# Patient Record
Sex: MALE | Race: OTHER | Employment: FULL TIME | URBAN - METROPOLITAN AREA
[De-identification: names, ages, dates, MRNs, and addresses within clinical notes are randomized per-mention and may not be internally consistent; named-entity substitution may affect disease eponyms.]

---

## 2018-01-14 ENCOUNTER — APPOINTMENT (OUTPATIENT)
Dept: GENERAL RADIOLOGY | Age: 53
End: 2018-01-14
Payer: MEDICARE

## 2018-01-14 ENCOUNTER — HOSPITAL ENCOUNTER (EMERGENCY)
Age: 53
Discharge: HOME | End: 2018-01-14
Payer: MEDICARE

## 2018-01-14 VITALS
TEMPERATURE: 97.8 F | OXYGEN SATURATION: 97 % | DIASTOLIC BLOOD PRESSURE: 73 MMHG | WEIGHT: 173.7 LBS | HEART RATE: 72 BPM | RESPIRATION RATE: 17 BRPM | BODY MASS INDEX: 29.65 KG/M2 | SYSTOLIC BLOOD PRESSURE: 148 MMHG | HEIGHT: 64 IN

## 2018-01-14 DIAGNOSIS — J40 BRONCHITIS: Primary | ICD-10-CM

## 2018-01-14 LAB
DIRECT EXAM: NORMAL
Lab: NORMAL
SPECIMEN DESCRIPTION: NORMAL
STATUS: NORMAL

## 2018-01-14 PROCEDURE — 87804 INFLUENZA ASSAY W/OPTIC: CPT

## 2018-01-14 PROCEDURE — 71046 X-RAY EXAM CHEST 2 VIEWS: CPT

## 2018-01-14 PROCEDURE — 99284 EMERGENCY DEPT VISIT MOD MDM: CPT

## 2018-01-14 PROCEDURE — 6360000002 HC RX W HCPCS: Performed by: NURSE PRACTITIONER

## 2018-01-14 PROCEDURE — 96372 THER/PROPH/DIAG INJ SC/IM: CPT

## 2018-01-14 RX ORDER — KETOROLAC TROMETHAMINE 30 MG/ML
60 INJECTION, SOLUTION INTRAMUSCULAR; INTRAVENOUS ONCE
Status: COMPLETED | OUTPATIENT
Start: 2018-01-14 | End: 2018-01-14

## 2018-01-14 RX ORDER — DEXTROMETHORPHAN HYDROBROMIDE AND PROMETHAZINE HYDROCHLORIDE 15; 6.25 MG/5ML; MG/5ML
5 SYRUP ORAL 4 TIMES DAILY PRN
Qty: 118 ML | Refills: 0 | Status: SHIPPED | OUTPATIENT
Start: 2018-01-14 | End: 2018-01-21

## 2018-01-14 RX ORDER — AZITHROMYCIN 250 MG/1
TABLET, FILM COATED ORAL
Qty: 1 PACKET | Refills: 0 | Status: SHIPPED | OUTPATIENT
Start: 2018-01-14 | End: 2018-01-24

## 2018-01-14 RX ADMIN — KETOROLAC TROMETHAMINE 60 MG: 30 INJECTION, SOLUTION INTRAMUSCULAR at 20:20

## 2018-01-14 ASSESSMENT — PAIN DESCRIPTION - FREQUENCY: FREQUENCY: INTERMITTENT

## 2018-01-14 ASSESSMENT — PAIN DESCRIPTION - ORIENTATION: ORIENTATION: LEFT;MID;UPPER

## 2018-01-14 ASSESSMENT — PAIN SCALES - GENERAL: PAINLEVEL_OUTOF10: 8

## 2018-01-14 ASSESSMENT — PAIN DESCRIPTION - LOCATION: LOCATION: ABDOMEN

## 2018-01-14 ASSESSMENT — PAIN DESCRIPTION - DESCRIPTORS: DESCRIPTORS: SHARP

## 2018-01-14 ASSESSMENT — PAIN DESCRIPTION - PAIN TYPE: TYPE: ACUTE PAIN

## 2018-01-15 ASSESSMENT — ENCOUNTER SYMPTOMS
VOMITING: 0
BACK PAIN: 1
SORE THROAT: 0
DIARRHEA: 0
SINUS PRESSURE: 0
COLOR CHANGE: 0
COUGH: 1
NAUSEA: 0
ABDOMINAL PAIN: 0
RHINORRHEA: 0
WHEEZING: 0
SHORTNESS OF BREATH: 0
CONSTIPATION: 0

## 2018-01-15 NOTE — ED NOTES
Pt presents reports flu like s/sx that has been ongoing for the past 2 days. Denies any NVD. Reports fatigue and generalized body aches. Has not had the flu vaccination this year. Respirations even, no labored.       Jennifer Scales RN  01/14/18 2032

## 2018-01-15 NOTE — ED PROVIDER NOTES
nausea and vomiting. Genitourinary: Negative for dysuria and hematuria. Musculoskeletal: Positive for back pain and myalgias. Negative for arthralgias. Skin: Negative for color change and rash. Neurological: Negative for dizziness, weakness and headaches. Hematological: Negative for adenopathy. Except as noted above the remainder of the review of systems was reviewed and negative. PHYSICAL EXAM    (up to 7 for level 4, 8 or more for level 5)     ED Triage Vitals [01/14/18 1921]   BP Temp Temp Source Pulse Resp SpO2 Height Weight   (!) 148/73 97.8 °F (36.6 °C) Oral 72 17 97 % 5' 4\" (1.626 m) 173 lb 11.2 oz (78.8 kg)       Physical Exam   Constitutional: He is oriented to person, place, and time. He appears well-developed and well-nourished. HENT:   Head: Normocephalic and atraumatic. Mouth/Throat: Oropharynx is clear and moist.   Eyes: Conjunctivae are normal. Pupils are equal, round, and reactive to light. Neck: Normal range of motion. Neck supple. Cardiovascular: Normal rate and regular rhythm. Pulmonary/Chest: Effort normal and breath sounds normal. No stridor. No respiratory distress. Abdominal: Soft. Bowel sounds are normal.   Musculoskeletal: Normal range of motion. Lymphadenopathy:     He has no cervical adenopathy. Neurological: He is alert and oriented to person, place, and time. Skin: Skin is warm and dry. No rash noted. Psychiatric: He has a normal mood and affect. Vitals reviewed.         RADIOLOGY:   Non-plain film images such as CT, Ultrasound and MRI are read by the radiologistMagalie Greer radiographic images are visualized and preliminarily interpreted by the emergency physician with the below findings:    Xr Chest Standard (2 Vw)    Result Date: 1/14/2018  EXAMINATION: TWO VIEWS OF THE CHEST 1/14/2018 8:12 pm COMPARISON: 07/13/2012 HISTORY: ORDERING SYSTEM PROVIDED HISTORY: cough TECHNOLOGIST PROVIDED HISTORY: Reason for exam:->cough Ordering Physician Provided

## 2019-05-05 ENCOUNTER — HOSPITAL ENCOUNTER (OUTPATIENT)
Age: 54
Setting detail: OBSERVATION
Discharge: HOME | End: 2019-05-06
Attending: EMERGENCY MEDICINE | Admitting: INTERNAL MEDICINE
Payer: MEDICARE

## 2019-05-05 ENCOUNTER — APPOINTMENT (OUTPATIENT)
Dept: CT IMAGING | Age: 54
End: 2019-05-05
Payer: MEDICARE

## 2019-05-05 ENCOUNTER — APPOINTMENT (OUTPATIENT)
Dept: GENERAL RADIOLOGY | Age: 54
End: 2019-05-05
Payer: MEDICARE

## 2019-05-05 DIAGNOSIS — R94.31 ABNORMAL EKG: ICD-10-CM

## 2019-05-05 DIAGNOSIS — R22.2 PARASPINAL MASS: ICD-10-CM

## 2019-05-05 DIAGNOSIS — R07.9 CHEST PAIN, UNSPECIFIED TYPE: Primary | ICD-10-CM

## 2019-05-05 DIAGNOSIS — R22.2 MASS OF CHEST WALL, RIGHT: ICD-10-CM

## 2019-05-05 DIAGNOSIS — R06.00 DYSPNEA, UNSPECIFIED TYPE: ICD-10-CM

## 2019-05-05 LAB
ABSOLUTE EOS #: 0.33 K/UL (ref 0–0.44)
ABSOLUTE IMMATURE GRANULOCYTE: 0.05 K/UL (ref 0–0.3)
ABSOLUTE LYMPH #: 3.09 K/UL (ref 1.1–3.7)
ABSOLUTE MONO #: 0.58 K/UL (ref 0.1–1.2)
ANION GAP SERPL CALCULATED.3IONS-SCNC: 12 MMOL/L (ref 9–17)
BASOPHILS # BLD: 1 % (ref 0–2)
BASOPHILS ABSOLUTE: 0.05 K/UL (ref 0–0.2)
BNP INTERPRETATION: NORMAL
BUN BLDV-MCNC: 10 MG/DL (ref 6–20)
BUN/CREAT BLD: 13 (ref 9–20)
CALCIUM SERPL-MCNC: 9.2 MG/DL (ref 8.6–10.4)
CHLORIDE BLD-SCNC: 106 MMOL/L (ref 98–107)
CO2: 22 MMOL/L (ref 20–31)
CREAT SERPL-MCNC: 0.79 MG/DL (ref 0.7–1.2)
DIFFERENTIAL TYPE: ABNORMAL
EOSINOPHILS RELATIVE PERCENT: 5 % (ref 1–4)
GFR AFRICAN AMERICAN: >60 ML/MIN
GFR NON-AFRICAN AMERICAN: >60 ML/MIN
GFR SERPL CREATININE-BSD FRML MDRD: ABNORMAL ML/MIN/{1.73_M2}
GFR SERPL CREATININE-BSD FRML MDRD: ABNORMAL ML/MIN/{1.73_M2}
GLUCOSE BLD-MCNC: 125 MG/DL (ref 70–99)
HCT VFR BLD CALC: 44.3 % (ref 40.7–50.3)
HEMOGLOBIN: 15.1 G/DL (ref 13–17)
IMMATURE GRANULOCYTES: 1 %
INR BLD: 1
LYMPHOCYTES # BLD: 43 % (ref 24–43)
MCH RBC QN AUTO: 30.8 PG (ref 25.2–33.5)
MCHC RBC AUTO-ENTMCNC: 34.1 G/DL (ref 28.4–34.8)
MCV RBC AUTO: 90.4 FL (ref 82.6–102.9)
MONOCYTES # BLD: 8 % (ref 3–12)
NRBC AUTOMATED: 0 PER 100 WBC
PARTIAL THROMBOPLASTIN TIME: 25.6 SEC (ref 23–31)
PDW BLD-RTO: 11.8 % (ref 11.8–14.4)
PLATELET # BLD: 267 K/UL (ref 138–453)
PLATELET ESTIMATE: ABNORMAL
PMV BLD AUTO: 8.3 FL (ref 8.1–13.5)
POTASSIUM SERPL-SCNC: 3.8 MMOL/L (ref 3.7–5.3)
PRO-BNP: <20 PG/ML
PROTHROMBIN TIME: 10.2 SEC (ref 9.7–11.6)
RBC # BLD: 4.9 M/UL (ref 4.21–5.77)
RBC # BLD: ABNORMAL 10*6/UL
SEG NEUTROPHILS: 43 % (ref 36–65)
SEGMENTED NEUTROPHILS ABSOLUTE COUNT: 3.1 K/UL (ref 1.5–8.1)
SODIUM BLD-SCNC: 140 MMOL/L (ref 135–144)
TROPONIN INTERP: NORMAL
TROPONIN T: NORMAL NG/ML
TROPONIN, HIGH SENSITIVITY: 8 NG/L (ref 0–22)
WBC # BLD: 7.2 K/UL (ref 3.5–11.3)
WBC # BLD: ABNORMAL 10*3/UL

## 2019-05-05 PROCEDURE — 1200000000 HC SEMI PRIVATE

## 2019-05-05 PROCEDURE — 84484 ASSAY OF TROPONIN QUANT: CPT

## 2019-05-05 PROCEDURE — 74177 CT ABD & PELVIS W/CONTRAST: CPT

## 2019-05-05 PROCEDURE — 71260 CT THORAX DX C+: CPT

## 2019-05-05 PROCEDURE — 80048 BASIC METABOLIC PNL TOTAL CA: CPT

## 2019-05-05 PROCEDURE — 6360000004 HC RX CONTRAST MEDICATION: Performed by: EMERGENCY MEDICINE

## 2019-05-05 PROCEDURE — 83880 ASSAY OF NATRIURETIC PEPTIDE: CPT

## 2019-05-05 PROCEDURE — 85610 PROTHROMBIN TIME: CPT

## 2019-05-05 PROCEDURE — 85730 THROMBOPLASTIN TIME PARTIAL: CPT

## 2019-05-05 PROCEDURE — 99285 EMERGENCY DEPT VISIT HI MDM: CPT

## 2019-05-05 PROCEDURE — 85025 COMPLETE CBC W/AUTO DIFF WBC: CPT

## 2019-05-05 PROCEDURE — 71045 X-RAY EXAM CHEST 1 VIEW: CPT

## 2019-05-05 PROCEDURE — 6370000000 HC RX 637 (ALT 250 FOR IP): Performed by: EMERGENCY MEDICINE

## 2019-05-05 PROCEDURE — 93005 ELECTROCARDIOGRAM TRACING: CPT

## 2019-05-05 PROCEDURE — 99221 1ST HOSP IP/OBS SF/LOW 40: CPT | Performed by: NURSE PRACTITIONER

## 2019-05-05 PROCEDURE — 2580000003 HC RX 258: Performed by: EMERGENCY MEDICINE

## 2019-05-05 PROCEDURE — 36415 COLL VENOUS BLD VENIPUNCTURE: CPT

## 2019-05-05 RX ORDER — ASPIRIN 81 MG/1
324 TABLET, CHEWABLE ORAL ONCE
Status: COMPLETED | OUTPATIENT
Start: 2019-05-05 | End: 2019-05-05

## 2019-05-05 RX ORDER — 0.9 % SODIUM CHLORIDE 0.9 %
80 INTRAVENOUS SOLUTION INTRAVENOUS ONCE
Status: COMPLETED | OUTPATIENT
Start: 2019-05-05 | End: 2019-05-05

## 2019-05-05 RX ORDER — SODIUM CHLORIDE 0.9 % (FLUSH) 0.9 %
10 SYRINGE (ML) INJECTION PRN
Status: DISCONTINUED | OUTPATIENT
Start: 2019-05-05 | End: 2019-05-06 | Stop reason: SDUPTHER

## 2019-05-05 RX ADMIN — ASPIRIN 81 MG 324 MG: 81 TABLET ORAL at 20:53

## 2019-05-05 RX ADMIN — SODIUM CHLORIDE 80 ML: 9 INJECTION, SOLUTION INTRAVENOUS at 22:18

## 2019-05-05 RX ADMIN — Medication 10 ML: at 22:19

## 2019-05-05 RX ADMIN — IOPAMIDOL 75 ML: 755 INJECTION, SOLUTION INTRAVENOUS at 22:18

## 2019-05-05 ASSESSMENT — PAIN SCALES - GENERAL
PAINLEVEL_OUTOF10: 5
PAINLEVEL_OUTOF10: 3

## 2019-05-05 ASSESSMENT — HEART SCORE: ECG: 1

## 2019-05-05 ASSESSMENT — ENCOUNTER SYMPTOMS
BACK PAIN: 0
ABDOMINAL DISTENTION: 0
EYE PAIN: 0
SHORTNESS OF BREATH: 1
FACIAL SWELLING: 0
ABDOMINAL PAIN: 0
CHEST TIGHTNESS: 1
EYE DISCHARGE: 0

## 2019-05-05 NOTE — LETTER
[address] [address]  Phone: [phone]             May 6, 2019    Patient: Camila Mao   YOB: 1965   Date of Visit: 5/5/2019       To Whom It May Concern:    Camila Mao was seen and treated in our facility  beginning 5/5/2019 until 5/6/2019. He may return to work after cleared from primary care physician in one week.       Sincerely,       Na Zhao RN         Signature:__________________________________

## 2019-05-06 ENCOUNTER — APPOINTMENT (OUTPATIENT)
Dept: MRI IMAGING | Age: 54
End: 2019-05-06
Payer: MEDICARE

## 2019-05-06 ENCOUNTER — APPOINTMENT (OUTPATIENT)
Dept: NUCLEAR MEDICINE | Age: 54
End: 2019-05-06
Payer: MEDICARE

## 2019-05-06 VITALS
OXYGEN SATURATION: 96 % | BODY MASS INDEX: 30.34 KG/M2 | SYSTOLIC BLOOD PRESSURE: 133 MMHG | HEIGHT: 64 IN | RESPIRATION RATE: 16 BRPM | DIASTOLIC BLOOD PRESSURE: 79 MMHG | HEART RATE: 77 BPM | WEIGHT: 177.7 LBS | TEMPERATURE: 97.5 F

## 2019-05-06 PROBLEM — E78.2 MIXED HYPERLIPIDEMIA: Status: ACTIVE | Noted: 2019-05-06

## 2019-05-06 PROBLEM — R07.89 MUSCULOSKELETAL CHEST PAIN: Status: ACTIVE | Noted: 2019-05-06

## 2019-05-06 PROBLEM — R22.2 PARASPINAL MASS: Status: ACTIVE | Noted: 2019-05-06

## 2019-05-06 PROBLEM — R07.1 CHEST PAIN ON BREATHING: Status: ACTIVE | Noted: 2019-05-05

## 2019-05-06 PROBLEM — R07.9 CHEST PAIN: Status: ACTIVE | Noted: 2019-05-06

## 2019-05-06 LAB
ALBUMIN SERPL-MCNC: 4.1 G/DL (ref 3.5–5.2)
ALBUMIN/GLOBULIN RATIO: NORMAL (ref 1–2.5)
ALP BLD-CCNC: 97 U/L (ref 40–129)
ALT SERPL-CCNC: 41 U/L (ref 5–41)
ANION GAP SERPL CALCULATED.3IONS-SCNC: 9 MMOL/L (ref 9–17)
AST SERPL-CCNC: 25 U/L
BILIRUB SERPL-MCNC: 0.37 MG/DL (ref 0.3–1.2)
BUN BLDV-MCNC: 10 MG/DL (ref 6–20)
BUN/CREAT BLD: 14 (ref 9–20)
CALCIUM SERPL-MCNC: 9.1 MG/DL (ref 8.6–10.4)
CHLORIDE BLD-SCNC: 107 MMOL/L (ref 98–107)
CHOLESTEROL/HDL RATIO: 4.5
CHOLESTEROL: 207 MG/DL
CO2: 24 MMOL/L (ref 20–31)
CREAT SERPL-MCNC: 0.73 MG/DL (ref 0.7–1.2)
EKG ATRIAL RATE: 64 BPM
EKG ATRIAL RATE: 70 BPM
EKG P AXIS: 36 DEGREES
EKG P AXIS: 46 DEGREES
EKG P-R INTERVAL: 148 MS
EKG P-R INTERVAL: 164 MS
EKG Q-T INTERVAL: 386 MS
EKG Q-T INTERVAL: 404 MS
EKG QRS DURATION: 122 MS
EKG QRS DURATION: 124 MS
EKG QTC CALCULATION (BAZETT): 416 MS
EKG QTC CALCULATION (BAZETT): 416 MS
EKG R AXIS: -36 DEGREES
EKG R AXIS: -48 DEGREES
EKG T AXIS: -11 DEGREES
EKG T AXIS: 12 DEGREES
EKG VENTRICULAR RATE: 64 BPM
EKG VENTRICULAR RATE: 70 BPM
GFR AFRICAN AMERICAN: >60 ML/MIN
GFR NON-AFRICAN AMERICAN: >60 ML/MIN
GFR SERPL CREATININE-BSD FRML MDRD: NORMAL ML/MIN/{1.73_M2}
GFR SERPL CREATININE-BSD FRML MDRD: NORMAL ML/MIN/{1.73_M2}
GLUCOSE BLD-MCNC: 96 MG/DL (ref 70–99)
HCT VFR BLD CALC: 46 % (ref 40.7–50.3)
HDLC SERPL-MCNC: 46 MG/DL
HEMOGLOBIN: 15.3 G/DL (ref 13–17)
LDL CHOLESTEROL: 118 MG/DL (ref 0–130)
LV EF: 51 %
LV EF: 60 %
LVEF MODALITY: NORMAL
LVEF MODALITY: NORMAL
MAGNESIUM: 2.2 MG/DL (ref 1.6–2.6)
MCH RBC QN AUTO: 30.4 PG (ref 25.2–33.5)
MCHC RBC AUTO-ENTMCNC: 33.3 G/DL (ref 28.4–34.8)
MCV RBC AUTO: 91.3 FL (ref 82.6–102.9)
NRBC AUTOMATED: 0 PER 100 WBC
PDW BLD-RTO: 11.8 % (ref 11.8–14.4)
PLATELET # BLD: 287 K/UL (ref 138–453)
PMV BLD AUTO: 8.5 FL (ref 8.1–13.5)
POTASSIUM SERPL-SCNC: 4.3 MMOL/L (ref 3.7–5.3)
RBC # BLD: 5.04 M/UL (ref 4.21–5.77)
SODIUM BLD-SCNC: 140 MMOL/L (ref 135–144)
TOTAL PROTEIN: 6.9 G/DL (ref 6.4–8.3)
TRIGL SERPL-MCNC: 214 MG/DL
TROPONIN INTERP: NORMAL
TROPONIN INTERP: NORMAL
TROPONIN T: NORMAL NG/ML
TROPONIN T: NORMAL NG/ML
TROPONIN, HIGH SENSITIVITY: 7 NG/L (ref 0–22)
TROPONIN, HIGH SENSITIVITY: 7 NG/L (ref 0–22)
VLDLC SERPL CALC-MCNC: ABNORMAL MG/DL (ref 1–30)
WBC # BLD: 7.3 K/UL (ref 3.5–11.3)

## 2019-05-06 PROCEDURE — G0378 HOSPITAL OBSERVATION PER HR: HCPCS

## 2019-05-06 PROCEDURE — 99217 PR OBSERVATION CARE DISCHARGE MANAGEMENT: CPT | Performed by: INTERNAL MEDICINE

## 2019-05-06 PROCEDURE — 83735 ASSAY OF MAGNESIUM: CPT

## 2019-05-06 PROCEDURE — 36415 COLL VENOUS BLD VENIPUNCTURE: CPT

## 2019-05-06 PROCEDURE — 84484 ASSAY OF TROPONIN QUANT: CPT

## 2019-05-06 PROCEDURE — 85027 COMPLETE CBC AUTOMATED: CPT

## 2019-05-06 PROCEDURE — 3430000000 HC RX DIAGNOSTIC RADIOPHARMACEUTICAL: Performed by: INTERNAL MEDICINE

## 2019-05-06 PROCEDURE — 6360000002 HC RX W HCPCS: Performed by: NURSE PRACTITIONER

## 2019-05-06 PROCEDURE — 6370000000 HC RX 637 (ALT 250 FOR IP): Performed by: NURSE PRACTITIONER

## 2019-05-06 PROCEDURE — A9579 GAD-BASE MR CONTRAST NOS,1ML: HCPCS | Performed by: NURSE PRACTITIONER

## 2019-05-06 PROCEDURE — 96372 THER/PROPH/DIAG INJ SC/IM: CPT

## 2019-05-06 PROCEDURE — 93306 TTE W/DOPPLER COMPLETE: CPT

## 2019-05-06 PROCEDURE — 6360000002 HC RX W HCPCS: Performed by: INTERNAL MEDICINE

## 2019-05-06 PROCEDURE — 72157 MRI CHEST SPINE W/O & W/DYE: CPT

## 2019-05-06 PROCEDURE — 2580000003 HC RX 258: Performed by: INTERNAL MEDICINE

## 2019-05-06 PROCEDURE — 6360000004 HC RX CONTRAST MEDICATION: Performed by: NURSE PRACTITIONER

## 2019-05-06 PROCEDURE — 80061 LIPID PANEL: CPT

## 2019-05-06 PROCEDURE — 80053 COMPREHEN METABOLIC PANEL: CPT

## 2019-05-06 PROCEDURE — 2580000003 HC RX 258: Performed by: NURSE PRACTITIONER

## 2019-05-06 PROCEDURE — A9500 TC99M SESTAMIBI: HCPCS | Performed by: INTERNAL MEDICINE

## 2019-05-06 PROCEDURE — 93017 CV STRESS TEST TRACING ONLY: CPT

## 2019-05-06 PROCEDURE — 78452 HT MUSCLE IMAGE SPECT MULT: CPT

## 2019-05-06 RX ORDER — ATROPINE SULFATE 0.1 MG/ML
0.5 INJECTION INTRAVENOUS EVERY 5 MIN PRN
Status: ACTIVE | OUTPATIENT
Start: 2019-05-06 | End: 2019-05-06

## 2019-05-06 RX ORDER — ONDANSETRON 2 MG/ML
4 INJECTION INTRAMUSCULAR; INTRAVENOUS EVERY 6 HOURS PRN
Status: DISCONTINUED | OUTPATIENT
Start: 2019-05-06 | End: 2019-05-06 | Stop reason: SDUPTHER

## 2019-05-06 RX ORDER — ONDANSETRON 2 MG/ML
4 INJECTION INTRAMUSCULAR; INTRAVENOUS EVERY 6 HOURS PRN
Status: DISCONTINUED | OUTPATIENT
Start: 2019-05-06 | End: 2019-05-06 | Stop reason: HOSPADM

## 2019-05-06 RX ORDER — ONDANSETRON 4 MG/1
4 TABLET, ORALLY DISINTEGRATING ORAL EVERY 6 HOURS PRN
Status: DISCONTINUED | OUTPATIENT
Start: 2019-05-06 | End: 2019-05-06 | Stop reason: HOSPADM

## 2019-05-06 RX ORDER — POTASSIUM CHLORIDE 7.45 MG/ML
10 INJECTION INTRAVENOUS PRN
Status: DISCONTINUED | OUTPATIENT
Start: 2019-05-06 | End: 2019-05-06 | Stop reason: SDUPTHER

## 2019-05-06 RX ORDER — SODIUM CHLORIDE 0.9 % (FLUSH) 0.9 %
10 SYRINGE (ML) INJECTION PRN
Status: ACTIVE | OUTPATIENT
Start: 2019-05-06 | End: 2019-05-06

## 2019-05-06 RX ORDER — ALBUTEROL SULFATE 90 UG/1
2 AEROSOL, METERED RESPIRATORY (INHALATION) PRN
Status: ACTIVE | OUTPATIENT
Start: 2019-05-06 | End: 2019-05-06

## 2019-05-06 RX ORDER — AMINOPHYLLINE DIHYDRATE 25 MG/ML
50 INJECTION, SOLUTION INTRAVENOUS PRN
Status: ACTIVE | OUTPATIENT
Start: 2019-05-06 | End: 2019-05-06

## 2019-05-06 RX ORDER — SODIUM CHLORIDE 0.9 % (FLUSH) 0.9 %
10 SYRINGE (ML) INJECTION
Status: COMPLETED | OUTPATIENT
Start: 2019-05-06 | End: 2019-05-06

## 2019-05-06 RX ORDER — POTASSIUM CHLORIDE 7.45 MG/ML
10 INJECTION INTRAVENOUS PRN
Status: DISCONTINUED | OUTPATIENT
Start: 2019-05-06 | End: 2019-05-06 | Stop reason: HOSPADM

## 2019-05-06 RX ORDER — SODIUM CHLORIDE 0.9 % (FLUSH) 0.9 %
10 SYRINGE (ML) INJECTION PRN
Status: DISCONTINUED | OUTPATIENT
Start: 2019-05-06 | End: 2019-05-06 | Stop reason: HOSPADM

## 2019-05-06 RX ORDER — METOPROLOL TARTRATE 5 MG/5ML
5 INJECTION INTRAVENOUS EVERY 5 MIN PRN
Status: ACTIVE | OUTPATIENT
Start: 2019-05-06 | End: 2019-05-06

## 2019-05-06 RX ORDER — SODIUM CHLORIDE 9 MG/ML
500 INJECTION, SOLUTION INTRAVENOUS CONTINUOUS PRN
Status: ACTIVE | OUTPATIENT
Start: 2019-05-06 | End: 2019-05-06

## 2019-05-06 RX ORDER — NITROGLYCERIN 0.4 MG/1
0.4 TABLET SUBLINGUAL EVERY 5 MIN PRN
Status: DISCONTINUED | OUTPATIENT
Start: 2019-05-06 | End: 2019-05-06

## 2019-05-06 RX ORDER — ATORVASTATIN CALCIUM 40 MG/1
40 TABLET, FILM COATED ORAL NIGHTLY
Status: DISCONTINUED | OUTPATIENT
Start: 2019-05-06 | End: 2019-05-06 | Stop reason: HOSPADM

## 2019-05-06 RX ORDER — ACETAMINOPHEN 325 MG/1
650 TABLET ORAL EVERY 4 HOURS PRN
Status: DISCONTINUED | OUTPATIENT
Start: 2019-05-06 | End: 2019-05-06 | Stop reason: HOSPADM

## 2019-05-06 RX ORDER — MAGNESIUM SULFATE 1 G/100ML
1 INJECTION INTRAVENOUS PRN
Status: DISCONTINUED | OUTPATIENT
Start: 2019-05-06 | End: 2019-05-06 | Stop reason: HOSPADM

## 2019-05-06 RX ORDER — ATORVASTATIN CALCIUM 40 MG/1
40 TABLET, FILM COATED ORAL NIGHTLY
Qty: 30 TABLET | Refills: 3 | Status: SHIPPED | OUTPATIENT
Start: 2019-05-06 | End: 2020-07-08 | Stop reason: ALTCHOICE

## 2019-05-06 RX ORDER — NICOTINE 21 MG/24HR
1 PATCH, TRANSDERMAL 24 HOURS TRANSDERMAL DAILY
Status: DISCONTINUED | OUTPATIENT
Start: 2019-05-06 | End: 2019-05-06 | Stop reason: HOSPADM

## 2019-05-06 RX ORDER — SODIUM CHLORIDE 0.9 % (FLUSH) 0.9 %
10 SYRINGE (ML) INJECTION EVERY 12 HOURS SCHEDULED
Status: DISCONTINUED | OUTPATIENT
Start: 2019-05-06 | End: 2019-05-06 | Stop reason: HOSPADM

## 2019-05-06 RX ORDER — POTASSIUM CHLORIDE 20 MEQ/1
40 TABLET, EXTENDED RELEASE ORAL PRN
Status: DISCONTINUED | OUTPATIENT
Start: 2019-05-06 | End: 2019-05-06 | Stop reason: HOSPADM

## 2019-05-06 RX ORDER — NITROGLYCERIN 0.4 MG/1
0.4 TABLET SUBLINGUAL EVERY 5 MIN PRN
Status: ACTIVE | OUTPATIENT
Start: 2019-05-06 | End: 2019-05-06

## 2019-05-06 RX ADMIN — Medication 10 ML: at 11:58

## 2019-05-06 RX ADMIN — REGADENOSON 0.4 MG: 0.08 INJECTION, SOLUTION INTRAVENOUS at 09:36

## 2019-05-06 RX ADMIN — ENOXAPARIN SODIUM 40 MG: 40 INJECTION SUBCUTANEOUS at 11:56

## 2019-05-06 RX ADMIN — TETRAKIS(2-METHOXYISOBUTYLISOCYANIDE)COPPER(I) TETRAFLUOROBORATE 39.8 MILLICURIE: 1 INJECTION, POWDER, LYOPHILIZED, FOR SOLUTION INTRAVENOUS at 12:45

## 2019-05-06 RX ADMIN — GADOTERIDOL 17 ML: 279.3 INJECTION, SOLUTION INTRAVENOUS at 13:41

## 2019-05-06 RX ADMIN — ASPIRIN 325 MG: 325 TABLET, DELAYED RELEASE ORAL at 11:56

## 2019-05-06 RX ADMIN — Medication 10 ML: at 09:36

## 2019-05-06 RX ADMIN — Medication 10 ML: at 13:41

## 2019-05-06 RX ADMIN — TETRAKIS(2-METHOXYISOBUTYLISOCYANIDE)COPPER(I) TETRAFLUOROBORATE 19.3 MILLICURIE: 1 INJECTION, POWDER, LYOPHILIZED, FOR SOLUTION INTRAVENOUS at 09:35

## 2019-05-06 ASSESSMENT — ENCOUNTER SYMPTOMS
COUGH: 0
SHORTNESS OF BREATH: 0
ABDOMINAL PAIN: 0
VOMITING: 0
BACK PAIN: 1
NAUSEA: 0

## 2019-05-06 ASSESSMENT — PAIN DESCRIPTION - DESCRIPTORS: DESCRIPTORS: ACHING

## 2019-05-06 ASSESSMENT — PAIN DESCRIPTION - ONSET: ONSET: ON-GOING

## 2019-05-06 ASSESSMENT — PAIN DESCRIPTION - PAIN TYPE: TYPE: ACUTE PAIN

## 2019-05-06 ASSESSMENT — PAIN DESCRIPTION - FREQUENCY: FREQUENCY: CONTINUOUS

## 2019-05-06 ASSESSMENT — PAIN SCALES - GENERAL
PAINLEVEL_OUTOF10: 5
PAINLEVEL_OUTOF10: 0

## 2019-05-06 ASSESSMENT — PAIN - FUNCTIONAL ASSESSMENT: PAIN_FUNCTIONAL_ASSESSMENT: PREVENTS OR INTERFERES SOME ACTIVE ACTIVITIES AND ADLS

## 2019-05-06 ASSESSMENT — PAIN DESCRIPTION - PROGRESSION: CLINICAL_PROGRESSION: NOT CHANGED

## 2019-05-06 ASSESSMENT — PAIN DESCRIPTION - LOCATION: LOCATION: BACK

## 2019-05-06 NOTE — PLAN OF CARE
Problem: Pain:  Goal: Control of acute pain  Description  Control of acute pain  Outcome: Ongoing  Patient complaining of back pain with an onset one week ago. Not taking any pain meds at this time. Writer will continue to monitor. Problem: Falls - Risk of:  Goal: Will remain free from falls  Description  Will remain free from falls  Outcome: Ongoing   Falling star program in place. Side rails up x2. Call light and personal belongings within reach. Continuing to maintain safe environment. Bed in lowest position and locked. Appropriate Identification armbands in place. Non-skid foot wear in place.

## 2019-05-06 NOTE — H&P
[facility]    HISTORY AND PHYSICAL EXAMINATION            Date:   5/6/2019  Patient name:  Callie Veronica  Date of admission:  5/5/2019  8:36 PM  MRN:   [id number]  Account:  [de-identified]  YOB: 1965  PCP:    Shemar Sneed MD  Room:   7280/7527-36  Code Status:    Full Code    Chief Complaint:     Chief Complaint   Patient presents with    Shortness of Breath     x weeks; sharp pain    Back Pain     upper; with inspiration       History Obtained From:     patient    History of Present Illness: The patient is a 52 y.o. / male who presents with Shortness of Breath (x weeks; sharp pain) and Back Pain (upper; with inspiration)   and he is admitted to the hospital for the management of  chest pain with breathing. The patient states for the last 2-3 weeks he has felt short of breath, dyspnic and fatigued. He reports that today he started having pain between his shoulder blades he rates a 5/10 when taking a deep breath. He describes his pain as sharp and intermittent with breathing. The patient denies trauma, diaphoresis, nausea or vomiting. His only medical history is hypertension. He states he quit smoking at least 15 or 20 years ago and he does not have a drinking history. Past Medical History:     Past Medical History:   Diagnosis Date    HTN (hypertension)         Past Surgical History:     Past Surgical History:   Procedure Laterality Date    APPENDECTOMY      VASECTOMY          Medications Prior to Admission:     Prior to Admission medications    Not on File        Allergies:     Patient has no known allergies. Social History:     Tobacco:    reports that he quit smoking about 19 years ago. He has never used smokeless tobacco.  Alcohol:      reports that he does not drink alcohol. Drug Use:  reports that he does not use drugs.     Family History:     Family History   Problem Relation Age of Onset    Heart Disease Mother        Review of Systems:     Positive and Negative as described in HPI. CONSTITUTIONAL:  Fatigue  HEENT:  negative for vision, hearing changes, runny nose, throat pain  RESPIRATORY:  Shortness of breath and dyspnea  CARDIOVASCULAR:  Positive for pain in the scapular area. GASTROINTESTINAL:  negative for nausea, vomiting, diarrhea, constipation, change in bowel habits, abdominal pain . GENITOURINARY:  negative for difficulty of urination, burning with urination, frequency   INTEGUMENT:  negative for rash, skin lesions, easy bruising   HEMATOLOGIC/LYMPHATIC:  negative for swelling/edema   ALLERGIC/IMMUNOLOGIC:  negative for urticaria , itching  ENDOCRINE:  negative increase in drinking, increase in urination, hot or cold intolerance  MUSCULOSKELETAL:  negative joint pains, muscle aches, swelling of joints  NEUROLOGICAL:  negative for headaches, dizziness, lightheadedness, numbness, pain, tingling extremities  BEHAVIOR/PSYCH:  negative for depression, anxiety    Physical Exam:   BP (!) 140/84   Pulse 65   Temp 98.2 °F (36.8 °C) (Oral)   Resp 17   Ht 5' 4\" (1.626 m)   Wt 175 lb (79.4 kg)   SpO2 96%   BMI 30.04 kg/m²   Temp (24hrs), Av.2 °F (36.8 °C), Min:98.2 °F (36.8 °C), Max:98.2 °F (36.8 °C)    No results for input(s): POCGLU in the last 72 hours. No intake or output data in the 24 hours ending 19 0230    General Appearance:  alert, well appearing, and in no acute distress  Mental status: oriented to person, place, and time with normal affect  Head:  normocephalic, atraumatic. Eye: no icterus, redness, pupils equal and reactive, extraocular eye movements intact, conjunctiva clear  Ear: normal external ear, no discharge, hearing intact  Nose:  no drainage noted  Mouth: mucous membranes moist  Neck: supple, no carotid bruits, thyroid not palpable.  No JVD  Lungs: Bilateral equal air entry, clear to auscultation, no wheezing, rales or rhonchi, normal effort  Cardiovascular: - 17 mmol/L    GFR Non-African American >60 >60 mL/min    GFR African American >60 >60 mL/min    GFR Comment          GFR Staging NOT REPORTED    Troponin    Collection Time: 05/05/19  8:45 PM   Result Value Ref Range    Troponin, High Sensitivity 8 0 - 22 ng/L    Troponin T NOT REPORTED <0.03 ng/mL    Troponin Interp NOT REPORTED    Brain Natriuretic Peptide    Collection Time: 05/05/19  8:45 PM   Result Value Ref Range    Pro-BNP <20 <300 pg/mL    BNP Interpretation Pro-BNP Reference Range:    Protime-INR    Collection Time: 05/05/19  8:45 PM   Result Value Ref Range    Protime 10.2 9.7 - 11.6 sec    INR 1.0    APTT    Collection Time: 05/05/19  8:45 PM   Result Value Ref Range    PTT 25.6 23 - 31 sec   Troponin    Collection Time: 05/06/19 12:33 AM   Result Value Ref Range    Troponin, High Sensitivity 7 0 - 22 ng/L    Troponin T NOT REPORTED <0.03 ng/mL    Troponin Interp NOT REPORTED      EKG in ER; NSR with RBBB and anterior fascicular block    Imaging/Diagnostics:    CT OF THE ABDOMEN AND PELVIS WITH CONTRAST 5/5/2019 10:09 pm   FINDINGS:  Lower Chest: There is a bilobed right paraspinal soft tissue mass that was  described on the concurrent CT chest.  Lung bases are otherwise clear. The  heart size is normal.    Organs: The liver, spleen, pancreas, adrenal glands and kidneys are normal.  The gallbladder is partially contracted. There are no calcified gallstones. GI/Bowel: The appendix is not with certainty visualized as discrete  structure. There are no inflammatory/edematous changes at the base of the  cecum to suggest appendicitis. There is a mild stool load in the  rectosigmoid colon. No evidence of bowel obstruction. Pelvis: Prostate gland, seminal vesicles and urinary bladder are unremarkable. Peritoneum/Retroperitoneum: There is no adenopathy, free air or free fluid. The abdominal aorta is normal in caliber with homogeneous enhancement.   No  evidence of aneurysm or dissection. Bones/Soft Tissues: There is no acute bone or soft tissue abnormality. There  are multilevel degenerative changes with mild to moderate central canal  stenosis at L4-L5. Impression:    No acute finding in the abdomen or pelvis. CTA OF THE CHEST 5/5/2019 10:09 pm     FINDINGS:  Pulmonary Arteries: Pulmonary arteries are adequately opacified for  evaluation. No evidence of intraluminal filling defect to suggest pulmonary  embolism. Main pulmonary artery is normal in caliber. Mediastinum: No evidence of mediastinal lymphadenopathy. The heart and  pericardium demonstrate no acute abnormality. There is no acute abnormality  of the thoracic aorta. Lungs/pleura: The lungs are without acute process. No focal consolidation or  pulmonary edema. No evidence of pleural effusion or pneumothorax. Upper Abdomen: Limited images of the upper abdomen are unremarkable. Soft Tissues/Bones: There is a bilobed low-attenuation right paraspinal soft  tissue mass (axial image 67-82). This mass measures 3.4 cm x 1.7 cm by 4.3  cm in length. There is no acute bone finding. Impression:    No evidence of pulmonary embolism or acute pulmonary abnormality. Bilobed low-attenuation right paraspinal soft tissue mass. 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 Correlation with thoracic MRI may be helpful. SINGLE XRAY VIEW OF THE CHEST    5/5/2019 9:03 pm   FINDINGS:  Lungs are clear. No cardiomegaly. Impression: In negative chest radiograph       Assessment :      Primary Problem  Chest pain on breathing    Active Hospital Problems    Diagnosis Date Noted    Chest pain on breathing [R07.1] 05/05/2019    Abnormal EKG [R94.31]     Dyspnea [R06.00]     Mass of chest wall, right [R22.2]        Plan:     Patient status Admit as inpatient in the  Med/Surge    1. Start aspirin and Lipitor daily  2. DVT prophylaxis  3.  Nitroglycerin as needed for chest pain  4. Follow-up labs in a.m. including lipid panel  5. MRI thoracic spine with and without contrast  6. Serial troponins  7. 2-D echo  8. Stress test in a.m.  9. Oxygen as needed  10. Monitor telemetry  11. EKG in a.m.  12. Plan discussed with the patient        Consultations:   IP CONSULT TO INTERNAL MEDICINE     Patient is admitted as inpatient status because of co-morbidities listed above, severity of signs and symptoms as outlined, requirement for current medical therapies and most importantly because of direct risk to patient if care not provided in a hospital setting.     UZIEL Tom - CNP  5/6/2019  2:30 AM    Copy sent to Dr. Idris Herrera MD

## 2019-05-06 NOTE — CARE COORDINATION
Case Management Initial Discharge Plan  Chongse Martinon,         Readmission Risk              Risk of Unplanned Readmission:        9             Met with:patient to discuss discharge plans. Information verified: address, contacts, phone number, , insurance Yes  PCP: Frederick Weinstein MD  Date of last visit: 1 year     Insurance Provider: [employer]     Discharge Planning  Current Residence:  Private home   Living Arrangements:  Spouse/Significant Other, Children       Home has 1 stories/1 stairs to climb  Support Systems:  Family Members, Spouse/Significant Other, Children       Current Services PTA:  None   Agency: none      Patient able to perform ADL's:Independent  DME in home:  None   DME used to aid ambulation prior to admission:   None   DME used during admission:  None     Potential Assistance Needed:  N/A    Pharmacy: [facility]    Potential Assistance Purchasing Medications:  No  Does patient want to participate in local refill/ meds to beds program?  Not Assessed    Patient agreeable to home care: No  [place] of choice provided:  n/a      Type of Home Care Services:  None  Patient expects to be discharged to:  home    Prior SNF/Rehab Placement and Facility: none   Agreeable to SNF/Rehab: No  [place] of choice provided: n/a   Evaluation: n/a    Expected Discharge date:  19  Follow Up Appointment: Best Day/ Time: Wednesday AM    Transportation provider: per wife   Transportation arrangements needed for discharge: No    Discharge Plan:   Patient lives with spouse and very independent. He works FT and drives. He is admitted with chest pain and  Per CT of chest:     Bilobed low-attenuation right paraspinal soft tissue mass.  This mass is of   uncertain etiology however a benign tumor of pleural or neurogenic origin is   favored.  Correlation with thoracic MRI may be helpful. Await MRI and will follow. No anticipated needs at this time.  If MRI is unremarkable and no follow up needed anticipate dc after stress test     Electronically signed by Darinel Mcnamara RN on 5/6/19 at 12:14 PM

## 2019-05-06 NOTE — ED PROVIDER NOTES
[address] [place] [address] ED  eMERGENCY dEPARTMENT eNCOUnter      Pt Name: Carolyn Saldivar  MRN: [id number]  [place] 1965  Date of evaluation: 5/5/2019  Provider: Felice Ahumada, MD    [address] [place] [address]       Chief Complaint   Patient presents with    Shortness of Breath     x weeks; sharp pain    Back Pain     upper; with inspiration     Care is turned over to me at shift change by Dr Darling Henderson. I am asked to followup on results of remaining investigations, additional care and to facilitate admission        DIAGNOSTIC RESULTS     RADIOLOGY:   Non-plain film images such as CT, Ultrasound and MRI are read by the radiologist. Plain radiographic images are visualized and preliminarily interpreted by the emergency physician with the below findings:      CT CHEST PULMONARY EMBOLISM W CONTRAST   Status: Final result   Order Providers     Authorizing Billing   MD Ryan Causey MD   Replaced:  CTA PULMONARY W CONTRAST          Signed by     Signed Date/Time  Phone Pager   Emily Munoz 5/05/2019 22:55 [phone]    [facility]     Read Date Phone Pager   [facility] May 5, 2019 [phone]    Radiation Dose Estimates     No radiation information found for this patient   Narrative   EXAMINATION:   CTA OF THE CHEST 5/5/2019 10:09 pm       TECHNIQUE:   CTA of the chest was performed after the administration of intravenous   contrast.  Multiplanar reformatted images are provided for review.  MIP   images are provided for review.  Dose modulation, iterative reconstruction,   and/or weight based adjustment of the mA/kV was utilized to reduce the   radiation dose to as low as reasonably achievable.       COMPARISON:   None.       HISTORY:   ORDERING SYSTEM PROVIDED HISTORY: shortness of breath       FINDINGS:   Pulmonary Arteries: Pulmonary arteries are adequately opacified for   evaluation.  No evidence of intraluminal filling defect to suggest pulmonary   embolism.  Main pulmonary artery is normal in caliber.       Mediastinum: No evidence of mediastinal lymphadenopathy.  The heart and   pericardium demonstrate no acute abnormality.  There is no acute abnormality   of the thoracic aorta.       Lungs/pleura: The lungs are without acute process.  No focal consolidation or   pulmonary edema.  No evidence of pleural effusion or pneumothorax.       Upper Abdomen: Limited images of the upper abdomen are unremarkable.       Soft Tissues/Bones: There is a bilobed low-attenuation right paraspinal soft   tissue mass (axial image 67-82).  This mass measures 3.4 cm x 1.7 cm by 4.3   cm in length.  There is no acute bone finding.           Impression   No evidence of pulmonary embolism or acute pulmonary abnormality.       Bilobed low-attenuation right paraspinal soft tissue mass.  This mass is of   uncertain etiology however a benign tumor of pleural or neurogenic origin is   favored.  Correlation with thoracic MRI may be helpful.         CT ABDOMEN PELVIS W IV CONTRAST Additional Contrast? None   Status: Final result   Order Providers     Authorizing Billing   MD Geovani Silva MD   Replaced:  CTA ABDOMEN PELVIS W CONTRAST          Signed by     Signed Date/Time  Phone Pager   Judy Kenyon 5/05/2019 23:05 [phone]    [facility]     Read Date Phone Pager   Judy Kenyon May 5, 2019 [phone]    Radiation Dose Estimates     No radiation information found for this patient   Narrative   EXAMINATION:   CT OF THE ABDOMEN AND PELVIS WITH CONTRAST 5/5/2019 10:09 pm       TECHNIQUE:   CT of the abdomen and pelvis was performed with the administration of   intravenous contrast. Multiplanar reformatted images are provided for review.    Dose modulation, iterative reconstruction, and/or weight based adjustment of   the mA/kV was utilized to reduce the radiation dose to as low as reasonably   achievable.       COMPARISON:   Concurrent CT chest.       HISTORY:   ORDERING SYSTEM PROVIDED HISTORY: aortic dissection   TECHNOLOGIST PROVIDED HISTORY:           FINDINGS:   Lower Chest: There is a bilobed right paraspinal soft tissue mass that was   described on the concurrent CT chest.  Lung bases are otherwise clear.  The   heart size is normal.       Organs: The liver, spleen, pancreas, adrenal glands and kidneys are normal.   The gallbladder is partially contracted.  There are no calcified gallstones.       GI/Bowel: The appendix is not with certainty visualized as discrete   structure.  There are no inflammatory/edematous changes at the base of the   cecum to suggest appendicitis. Vicie Pastel is a mild stool load in the   rectosigmoid colon.  No evidence of bowel obstruction.       Pelvis: Prostate gland, seminal vesicles and urinary bladder are unremarkable.       Peritoneum/Retroperitoneum: There is no adenopathy, free air or free fluid. The abdominal aorta is normal in caliber with homogeneous enhancement.  No   evidence of aneurysm or dissection.       Bones/Soft Tissues: There is no acute bone or soft tissue abnormality.  There   are multilevel degenerative changes with mild to moderate central canal   stenosis at L4-L5.           Impression   No acute finding in the abdomen or pelvis.               Interpretation per the Radiologist below, if available at the time of this note:    CT ABDOMEN PELVIS W IV CONTRAST Additional Contrast? None   Final Result   No acute finding in the abdomen or pelvis. CT CHEST PULMONARY EMBOLISM W CONTRAST   Final Result   No evidence of pulmonary embolism or acute pulmonary abnormality. Bilobed low-attenuation right paraspinal soft tissue mass. This mass is of   uncertain etiology however a benign tumor of pleural or neurogenic origin is   favored. Correlation with thoracic MRI may be helpful. XR CHEST PORTABLE   Final Result   In negative chest radiograph.                  LABS:  Labs Reviewed   CBC WITH AUTO DIFFERENTIAL - Abnormal; Notable for the following Range: 1 - 4 % 5 (H)   Basophils # Latest Ref Range: 0.00 - 0.20 k/uL 0.05   Differential Type Unknown NOT REPORTED   Seg Neutrophils Latest Ref Range: 36 - 65 % 43   Segs Absolute Latest Ref Range: 1.50 - 8.10 k/uL 3.10   Lymphocytes Latest Ref Range: 24 - 43 % 43   Absolute Lymph # Latest Ref Range: 1.10 - 3.70 k/uL 3.09   Monocytes Latest Ref Range: 3 - 12 % 8   Absolute Eos # Latest Ref Range: 0.00 - 0.44 k/uL 0.33   Basophils Latest Ref Range: 0 - 2 % 1   Immature Granulocytes Latest Ref Range: 0 % 1 (H)   WBC Morphology Unknown NOT REPORTED   RBC Morphology Unknown NOT REPORTED   Prothrombin Time Latest Ref Range: 9.7 - 11.6 sec 10.2   INR Unknown 1.0   PTT Latest Ref Range: 23 - 31 sec 25.6       All other labs were within normal range or not returned as of this dictation. EMERGENCY DEPARTMENT COURSE and DIFFERENTIAL DIAGNOSIS/MDM:   Vitals:    Vitals:    05/05/19 2027 05/05/19 2044 05/05/19 2235 05/05/19 2237   BP: (!) 139/91 135/79 137/79    Pulse: 78 77  76   Resp: 16 12  17   Temp: 98.2 °F (36.8 °C)      TempSrc: Oral      SpO2: 95% 97%  96%   Weight: 175 lb (79.4 kg)      Height: 5' 4\" (1.626 m)            CONSULTS:  IP CONSULT TO INTERNAL MEDICINE    PROCEDURES:  None    FINAL IMPRESSION      1. Chest pain, unspecified type    2. Dyspnea, unspecified type    3. Abnormal EKG    4. Mass of chest wall, right          DISPOSITION/PLAN   DISPOSITION Decision To Admit 05/05/2019 11:09:15 PM      PATIENT REFERRED TO:   No follow-up provider specified.     DISCHARGE MEDICATIONS:     New Prescriptions    No medications on file         (Please note that portions of this note were completed with a voice recognition program.  Efforts were made to edit the dictations but occasionally words are mis-transcribed.)    Andressa Nielsen MD  Attending Emergency Physician          Andressa Nielsen MD  05/05/19 1818

## 2019-05-06 NOTE — PROCEDURES
[email or web address]                 [address] [facility], [address] [facility]                              CARDIAC STRESS TEST    PATIENT NAME: Meena Hernandez                       :        1965  MED REC NO:   [id number]                             ROOM:       8711  ACCOUNT NO:   [de-identified]                           ADMIT DATE: 2019  PROVIDER:     Jose Olivier    DATE OF STUDY:  2019    LEXISCAN MYOVIEW STRESS TEST    ATTENDING PROVIDER:  Elaine Norton MD    PRIMARY CARE PROVIDER:  Eleanor Mooney MD    PERFORMING PHYSICIAN: Kim Jean MD    INDICATION:  Chest pain. HEART RATE  100% max predicted heart rate:  165  85% max predicted heart rate:  140  Duration:  1:00    Resting heart rate:  68  Maximum heart rate achieved:  120  % of predicted maximum:  72%    BLOOD PRESSURE  Resting BP:  130/78  Peak BP:  130/78  METS:  1.0    MEDICATIONS GIVEN:  0.4 mg Lexiscan    REASON FOR TERMINATION:   Medication infusion complete. BASELINE EKG DEMONSTRATED:  Sinus rhythm. RBBB. During the stress test, the patient reported: No symptoms. STRESS EKG DEMONSTRATED:  No abnormal change. HEART RATE RESPONSE:   Normal response. BLOOD PRESSURE RESPONSE:   Normal response. ECG IMPRESSION:  Negative. FINAL IMPRESSION:  Negative.         Sami Myles    D: 2019 12:52:53       T: 2019 12:56:24     MT/[email or web address]_CT  Job#: [id number]     Doc#: Unknown

## 2019-05-06 NOTE — FLOWSHEET NOTE
Report received from ER. Patient transferred via wheelchair to PCU 1014. Patient oriented to room. Admission history complete.

## 2019-05-06 NOTE — PROGRESS NOTES
[facility]    Progress Note    5/6/2019    5:55 PM    Name:   Sweetie Nash  MRN:     [id number]     Acct:      [de-identified]   Room:   [address] [place] [address] Day:  1  Admit Date:  5/5/2019  8:36 PM    PCP:   Luis Daniel Savage MD  Code Status:  Full Code    Subjective:     C/C:   Chief Complaint   Patient presents with    Shortness of Breath     x weeks; sharp pain    Back Pain     upper; with inspiration     Interval History Status:  Improved  Pain-free at this time    Database updates:  Triglycerides 214High   Cholesterol 207High     Brief History:     As documented in the medical record: \"The patient is a 52 y.o. / male who presents with Shortness of Breath (x weeks; sharp pain) and Back Pain (upper; with inspiration)   and he is admitted to the hospital for the management of  chest pain with breathing. The patient states for the last 2-3 weeks he has felt short of breath, dyspnic and fatigued. He reports that today he started having pain between his shoulder blades he rates a 5/10 when taking a deep breath. He describes his pain as sharp and intermittent with breathing. The patient denies trauma, diaphoresis, nausea or vomiting. His only medical history is hypertension. He states he quit smoking at least 15 or 20 years ago and he does not have a drinking history. 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   Bilobed low-attenuation right paraspinal soft tissue mass. 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 Correlation with thoracic MRI may be helpful. No acute finding in the abdomen or pelvis. MRI revealed:  Impression:   Paravertebral soft tissue masses adjacent to the T9-10 disc level and T10  vertebral body on the right as described above. This is nonspecific although  likely represents a benign nerve sheath tumor or neurofibroma. Short-term  follow-up MRI is recommended in 3 months to assess stability. Echocardiogram:    Summary  Left ventricle is normal in size. Mild left ventricular hypertrophy. Global left ventricular systolic function is normal with an estimated  ejection fraction of 60% . No obvious wall motion abnormality seen. Grade I (mild) left ventricular diastolic dysfunction. No significant valvular regurgitation or stenosis seen. No significant pericardial effusion is seen. Cardiac enzymes have been stable    Stress test:  Impression:   Perfusion: Normal exam  Function:  Normal exam  Risk stratification:  Low     EKG:Normal sinus rhythm  Left axis deviation  Left ventricular hypertrophy with QRS widening  Abnormal ECG  When compared with ECG of 13-JUL-2012 11:09,  QRS axis Shifted left     The patient responded to conservative therapy  He was discharged in improved condition       Past Medical History:   has a past medical history of HTN (hypertension). Social History:   reports that he quit smoking about 19 years ago. He has never used smokeless tobacco. He reports that he does not drink alcohol or use drugs. Family History:   Family History   Problem Relation Age of Onset    Heart Disease Mother        Medications:      Allergies:  No Known Allergies    Current Meds:   Scheduled Meds:   sodium chloride flush  10 mL Intravenous 2 times per day    atorvastatin  40 mg Oral Nightly    aspirin  325 mg Oral Daily    enoxaparin  40 mg Subcutaneous Daily    nicotine  1 patch Transdermal Daily     Continuous Infusions:   PRN Meds: sodium chloride flush, potassium chloride **OR** potassium alternative oral replacement **OR** potassium chloride, magnesium sulfate, magnesium hydroxide, acetaminophen, ondansetron **OR** ondansetron      Review of Systems:     Review of Systems   Respiratory: Negative for cough and shortness of breath. Cardiovascular: Positive for chest pain (resolved ). Negative for palpitations and leg swelling. Gastrointestinal: Negative for abdominal pain, nausea and vomiting. Genitourinary: Negative for difficulty urinating and hematuria. Musculoskeletal: Positive for back pain. Arthralgias: improving          Physical Examination:        Vitals:  /79   Pulse 77   Temp 97.5 °F (36.4 °C) (Oral)   Resp 16   Ht 5' 4\" (1.626 m)   Wt 177 lb 11.2 oz (80.6 kg)   SpO2 96%   BMI 30.50 kg/m²   Temp (24hrs), Av.9 °F (36.6 °C), Min:97.5 °F (36.4 °C), Max:98.2 °F (36.8 °C)    No results for input(s): POCGLU in the last 72 hours. Physical Exam   Constitutional: He is oriented to person, place, and time. No distress. HENT:   Head: Normocephalic. Nose: Nose normal.   Eyes: Conjunctivae are normal. No scleral icterus. Neck: Neck supple. No tracheal deviation present. Cardiovascular: Normal rate and regular rhythm. Pulmonary/Chest: Effort normal and breath sounds normal. No respiratory distress. He has no wheezes. He has no rales. He exhibits no tenderness. Abdominal: Soft. Bowel sounds are normal. He exhibits no distension. There is no tenderness. Musculoskeletal: He exhibits no edema or tenderness. Neurological: He is alert and oriented to person, place, and time. Skin: Skin is warm and dry. He is not diaphoretic. Vitals reviewed. Data:     I/O (24Hr):   No intake or output data in the 24 hours ending 19 1755    Labs:    Hematology:  Recent Labs     19  0542   WBC 7.2 7.3   HGB 15.1 15.3   HCT 44.3 46.0    287   INR 1.0  --      Chemistry:  Recent Labs     05/05/19 2045 05/06/19  0542    140   K 3.8 4.3    107   CO2 22 24   GLUCOSE 125* 96   BUN 10 10   CREATININE 0.79 0.73   MG  --  2.2   CALCIUM 9.2 9.1     Recent Labs     05/06/19  0542   PROT 6.9   LABALBU 4.1   AST 25   ALT 41   ALKPHOS 97   BILITOT 0.37   CHOL 207*   TRIG 214*   HDL 46       Lab Results   Component Value Date/Time    SPECIAL NOT REPORTED 01/14/2018 07:42 PM     No results found for: CULTURE    No results found for: POCPH, PHART, PH, POCPCO2, GBI2GBJ, PCO2, POCPO2, PO2ART, PO2, POCHCO3, SOQ1YRV, HCO3, NBEA, PBEA, BEART, BE, THGBART, THB, FYA0UAR, XNPT9CYO, T9UDZQDI, O2SAT, FIO2    Radiology / Roshan Jews:  See above      Assessment:        Primary Problem  Principal Problem:    Musculoskeletal chest pain  Active Problems:    Abnormal EKG    Dyspnea    Mass of chest wall, right    Mixed hyperlipidemia    Paraspinal mass     Chest pain  Resolved Problems:    * No resolved hospital problems.  *      Plan         Discharge planning  Blood Pressure - Monitor and control   Risk factor management   F/U MRI 3 months - paraspinal mass:  Ordered  The patient was instructed to follow up with their PCP, Sherice Trejo MD in one week       IP CONSULT TO [facility] [employer], DO    5/6/2019    5:55 PM

## 2019-05-06 NOTE — ED NOTES
Pt presents to ED with c/o SOB and upper back pain x 3 weeks; pt states he has pain with inspiration that radiates to back; denies any cardiac hx; reports 5/10, sharp pain. Pt is a+ox4, resp equal and non labored, PERRLA, pt denies distress at this time.       Angelica Guzman RN  05/05/19 2039

## 2019-05-06 NOTE — ED PROVIDER NOTES
EMERGENCY DEPARTMENT ENCOUNTER    Pt Name: Jeremi Caro  MRN: [id number]  [place] 1965  Date of evaluation: 19  CHIEF COMPLAINT       Chief Complaint   Patient presents with    Shortness of Breath     x weeks; sharp pain    Back Pain     upper; with inspiration     HISTORY OF PRESENT ILLNESS   HPI   The patient is a 24-year-old male who presented to emergency department from home with a one-week history of chest pain, shortness of breath and back pain. Patient complains of sudden onset of chest pain radiation to his back and between his shoulder blades 5 out of 10 on the pain scale with associated shortness of breath increases inspiration, he admits to diaphoresis however no nausea and vomiting. He denied any heavy lifting or trauma. No recent travel, surgery or immobility. No history of PE. Positive family history of coronary artery disease mother  of heart attack in her 76s. He's never had a stress test or heart catheterization. Patient last tobacco use, hyperlipidemia, diabetes, hypertension. REVIEW OF SYSTEMS     Review of Systems   Constitutional: Negative for chills, diaphoresis and fever. HENT: Negative for congestion, ear pain and facial swelling. Eyes: Negative for pain, discharge and visual disturbance. Respiratory: Positive for chest tightness and shortness of breath. Cardiovascular: Negative for chest pain and palpitations. Gastrointestinal: Negative for abdominal distention and abdominal pain. Genitourinary: Negative for difficulty urinating and flank pain. Musculoskeletal: Negative for back pain. Skin: Negative for wound. Neurological: Negative for dizziness, light-headedness and headaches. PASTMEDICAL HISTORY   History reviewed. No pertinent past medical history. SURGICAL HISTORY     History reviewed. No pertinent surgical history. CURRENT MEDICATIONS       Previous Medications    No medications on file     ALLERGIES     has No Known Allergies.   FAMILY HISTORY     has no family status information on file. SOCIAL HISTORY       Social History     Tobacco Use    Smoking status: Former Smoker    Smokeless tobacco: Never Used   Substance Use Topics    Alcohol use: No    Drug use: No     PHYSICAL EXAM     INITIAL VITALS: /79   Pulse 77   Temp 98.2 °F (36.8 °C) (Oral)   Resp 12   Ht 5' 4\" (1.626 m)   Wt 175 lb (79.4 kg)   SpO2 97%   BMI 30.04 kg/m²    Physical Exam   Constitutional: He appears well-developed and well-nourished. HENT:   Head: Normocephalic and atraumatic. Eyes: Pupils are equal, round, and reactive to light. EOM are normal.   Neck: Normal range of motion. Neck supple. Pulmonary/Chest: Effort normal and breath sounds normal.   Abdominal: Soft. Bowel sounds are normal.   Musculoskeletal: Normal range of motion. Neurological: He is alert. Skin: Skin is warm. Nursing note and vitals reviewed. MEDICAL DECISION MAKING:   The was seen and examined. Patient's 56-year-old male presented to emergency department secondary chest pain and shortness of breath. Differential diagnoses included but not limited to ACS, PE, aortic dissection, aortic aneurysm. EKG on arrival normal sinus rhythm are evidence of a right bundle branch block, left anterior fascicular block and LVH, no previous EKG to compare. Patient received aspirin, morphine and Zofran. Chest x-ray labs including the following imaging: CTA rule out PE and CT abdomen rule out aneurysm. Troponin not elevated. Imaging pending at this time. Patient will be endorsed to Dr. Evert Malone N/A results, patient will require admission for cycling cardiac enzymes and further ancillary testing.          CRITICAL CARE:              NIH STROKE SCALE:            PROCEDURES:    Procedures    DIAGNOSTIC RESULTS   EKG:All EKG's are interpreted by the Emergency Department Physician who either signs or Co-signs this chart in the absence of a cardiologist.        RADIOLOGY:All plain

## 2019-05-06 NOTE — PLAN OF CARE
Pt has had minimal pain, midchest to back increased with deep breathing. Stress test negative. Anticipate discharge today.

## 2019-05-07 NOTE — DISCHARGE SUMMARY
[facility]    Discharge Summary     Patient ID: Ever Jorgensen  :  1965   MRN: [id number]     ACCOUNT:  [de-identified]   Patient's PCP: Darrius Sparks MD  Admit Date: 2019   Discharge Date: 2019    Discharge Physician: Adalid Gutierrez DO     The patient was seen and examined on day of discharge and this discharge summary is in conjunction with any daily progress note from day of discharge. Active Discharge Diagnoses:     Primary Problem  Musculoskeletal chest pain      Hospital Problems  Active Hospital Problems    Diagnosis Date Noted    Musculoskeletal chest pain [R07.89] 2019    Mixed hyperlipidemia [E78.2] 2019    Paraspinal mass  [R22.2] 2019    Chest pain [R07.9] 2019    Abnormal EKG [R94.31]     Dyspnea [R06.00]     Mass of chest wall, right [R22.2]          Hospital Course:     Brief History:  As documented in the medical record: \"The patient is a 45 y. o.  / male who presents with Shortness of Breath (x weeks; sharp pain) and Back Pain (upper; with inspiration)   and he is admitted to the hospital for the management of  chest pain with breathing. The patient states for the last 2-3 weeks he has felt short of breath, dyspnic and fatigued. He reports that today he started having pain between his shoulder blades he rates a 5/10 when taking a deep breath. He describes his pain as sharp and intermittent with breathing. The patient denies trauma, diaphoresis, nausea or vomiting. His only medical history is hypertension.  He states he quit smoking at least 15 or 20 years ago and he does not have a drinking history. 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 Bilobed low-attenuation right paraspinal soft tissue mass.  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  Correlation with thoracic MRI may be helpful. No acute finding in the abdomen or pelvis.      MRI revealed:  Impression:   Paravertebral soft tissue masses adjacent to the T9-10 disc level and T10  vertebral body on the right as described above.  This is nonspecific although  likely represents a benign nerve sheath tumor or neurofibroma.  Short-term  follow-up MRI is recommended in 3 months to assess stability.      Echocardiogram:  Summary  Left ventricle is normal in size. Mild left ventricular hypertrophy. Global left ventricular systolic function is normal with an estimated  ejection fraction of 60% . No obvious wall motion abnormality seen. Grade I (mild) left ventricular diastolic dysfunction. No significant valvular regurgitation or stenosis seen.   No significant pericardial effusion is seen.      Cardiac enzymes have been stable     Stress test:  Impression:   Perfusion: Normal exam  Function:  Normal exam  Risk stratification:  Low      EKG:Normal sinus rhythm  Left axis deviation  Left ventricular hypertrophy with QRS widening  Abnormal ECG  When compared with ECG of 13-JUL-2012 11:09,  QRS axis Shifted left      The patient responded to conservative therapy  He was discharged in improved condition          Discharge plan:     Discharge planning  Blood Pressure - Monitor and control   Risk factor management   F/U MRI 3 months - paraspinal mass:  Ordered  The patient was instructed to follow up with their PCP, Frederick Weinstein MD in one week      Significant Diagnostic Studies:   Labs / Micro:   Hematology:  Recent Labs     05/05/19 2045 05/06/19  0542   WBC 7.2 7.3   HGB 15.1 15.3   HCT 44.3 46.0    287   INR 1.0  --      Chemistry:  Recent Labs     05/05/19 2045 05/06/19  0542    140   K 3.8 4.3    107   CO2 22 24   GLUCOSE 125* 96   BUN 10 10   CREATININE 0.79 0.73   MG  --  2.2   CALCIUM 9.2 9.1     Recent Labs     05/06/19  0542   PROT 6.9   LABALBU 4.1   AST 25   ALT 41   ALKPHOS 97   BILITOT 0.37   CHOL 207*   TRIG 214*   HDL 46         Radiology:    Mri Thoracic Spine W Wo Contrast    Result Date: 5/6/2019  EXAMINATION: MRI OF THE THORACIC SPINE WITHOUT AND WITH CONTRAST  5/6/2019 12:48 pm TECHNIQUE: Multiplanar multisequence MRI of the thoracic spine was performed without and with the administration of intravenous contrast. COMPARISON: None HISTORY: ORDERING SYSTEM PROVIDED HISTORY: Paraspinal soft tissue mass, right TECHNOLOGIST PROVIDED HISTORY: Ordering Physician Provided Reason for Exam: back pain for several days. between scapulas Acuity: Chronic Type of Exam: Subsequent/Follow-up Additional signs and symptoms: SOB Relevant Medical/Surgical History: CT shows soft tissue paraspinal mass FINDINGS: BONES/ALIGNMENT: The vertebral body heights are maintained. There is age-appropriate bone marrow signal.  There is a normal thoracic kyphosis. There is no spondylolisthesis. SPINAL CORD: The spinal cord is normal in caliber and signal. SOFT TISSUES:  The posterior paraspinal soft tissues are unremarkable. Within the paravertebral soft tissues adjacent to the T9-10 level there is a bilobed mass that is heterogeneous on T2 weighted imaging, T1 hypointense, and demonstrates homogeneous diffuse postcontrast enhancement. Overall this measures approximately 3.2 x 1.7 cm.   There is a 2nd perivertebral mass with similar characteristics in the right paravertebral soft tissues adjacent to the T10 vertebral body and this measures approximately 1.6 x 1.1 cm. The visualized thoracic and upper abdominal soft tissues are unremarkable. DEGENERATIVE CHANGES: There is multilevel degenerative disc disease with loss of disc signal.  There is no significant disc space narrowing. There is no spondylolisthesis. There is multilevel degenerative facet hypertrophy. There is no significant canal stenosis or foraminal narrowing appreciated. Paravertebral soft tissue masses adjacent to the T9-10 disc level and T10 vertebral body on the right as described above. This is nonspecific although likely represents a benign nerve sheath tumor or neurofibroma. Short-term follow-up MRI is recommended in 3 months to assess stability. Multilevel degenerative disc disease and degenerative facet hypertrophy without significant canal stenosis or foraminal narrowing. Ct Abdomen Pelvis W Iv Contrast Additional Contrast? None    Result Date: 5/5/2019  EXAMINATION: CT OF THE ABDOMEN AND PELVIS WITH CONTRAST 5/5/2019 10:09 pm TECHNIQUE: CT of the abdomen and pelvis was performed with the administration of intravenous contrast. Multiplanar reformatted images are provided for review. Dose modulation, iterative reconstruction, and/or weight based adjustment of the mA/kV was utilized to reduce the radiation dose to as low as reasonably achievable. COMPARISON: Concurrent CT chest. HISTORY: ORDERING SYSTEM PROVIDED HISTORY: aortic dissection TECHNOLOGIST PROVIDED HISTORY: FINDINGS: Lower Chest: There is a bilobed right paraspinal soft tissue mass that was described on the concurrent CT chest.  Lung bases are otherwise clear. The heart size is normal. Organs: The liver, spleen, pancreas, adrenal glands and kidneys are normal. The gallbladder is partially contracted. There are no calcified gallstones. GI/Bowel: The appendix is not with certainty visualized as discrete structure. There are no inflammatory/edematous changes at the base of the cecum to suggest appendicitis. There is a mild stool load in the rectosigmoid colon. No evidence of bowel obstruction. Pelvis: Prostate gland, seminal vesicles and urinary bladder are unremarkable. Peritoneum/Retroperitoneum: There is no adenopathy, free air or free fluid. The abdominal aorta is normal in caliber with homogeneous enhancement. No evidence of aneurysm or dissection. Bones/Soft Tissues: There is no acute bone or soft tissue abnormality. There are multilevel degenerative changes with mild to moderate central canal stenosis at L4-L5. No acute finding in the abdomen or pelvis. Xr Chest Portable    Result Date: 5/5/2019  EXAMINATION: SINGLE XRAY VIEW OF THE CHEST 5/5/2019 9:03 pm COMPARISON: January 14, 2018 HISTORY: ORDERING SYSTEM PROVIDED HISTORY: chest pain TECHNOLOGIST PROVIDED HISTORY: chest pain Ordering Physician Provided Reason for Exam: chest pain Acuity: Acute Type of Exam: Initial FINDINGS: Lungs are clear. No cardiomegaly. In negative chest radiograph. Ct Chest Pulmonary Embolism W Contrast    Result Date: 5/5/2019  EXAMINATION: CTA OF THE CHEST 5/5/2019 10:09 pm TECHNIQUE: CTA of the chest was performed after the administration of intravenous contrast.  Multiplanar reformatted images are provided for review. MIP images are provided for review. Dose modulation, iterative reconstruction, and/or weight based adjustment of the mA/kV was utilized to reduce the radiation dose to as low as reasonably achievable. COMPARISON: None. HISTORY: ORDERING SYSTEM PROVIDED HISTORY: shortness of breath FINDINGS: Pulmonary Arteries: Pulmonary arteries are adequately opacified for evaluation. No evidence of intraluminal filling defect to suggest pulmonary embolism. Main pulmonary artery is normal in caliber. Mediastinum: No evidence of mediastinal lymphadenopathy. The heart and pericardium demonstrate no acute abnormality. There is no acute abnormality of the thoracic aorta. Lungs/pleura:  The lungs are without acute Low Note on Risk Stratification: The above risk stratification is based on myocardial perfusion findings. Final risk assessment may be adjusted based on other clinical and noninvasive test findings. Risk stratification criteria are adapted from \"Noninvasive Risk Stratification\" criteria from [facility]. al, ACC/AATS/AHA/ASE/ASNC/SCAI/SCCT/STS 2017 Appropriate Use Criteria For Coronary Revascularization in Patients With Stable Ischemic Heart Disease [facility] Volume 69, Issue 17, May 2017 High risk (>3% annual death or MI) 1. Severe resting LV dysfunction (LVEF >35%) not readily explained by non coronary causes 2. Resting perfusion abnormalities greater than 10% of the myocardium in patients without prior history or evidence of MI 3. Stress-induced perfusion abnormalities encumbering greater than or equal to 10% myocardium or stress segmental scores indicating multiple vascular territories with abnormalities 4. Stress-induced LV dilatation (TID ratio greater than 1.19 for exercise and greater than 1.39 for regadenoson) Intermediate risk (1% to 3% annual death or MI) 1. Mild/moderate resting LV dysfunction (LVEF 35% to 49%) not readily explained by non coronary causes. 2. Resting perfusion abnormalities in 5%-9.9% of the myocardium in patients without a history or prior evidence of MI 3. Stress-induced perfusion abnormality encumbering 5%-9.9% of the myocardium or stress segmental scores indicating 1 vascular territory with abnormalities but without LV dilation 4. Small wall motion abnormality involving 1-2 segments and only 1 coronary bed. Low Risk (Less than 1% annual death or MI) 1. Normal or small myocardial perfusion defect at rest or with stress encumbering less than 5% of the myocardium.          Consultations:    Consults:     Final Specialist Recommendations/Findings:   IP CONSULT TO INTERNAL MEDICINE        Discharged Condition:    Stable     Disposition: Home    Physician Follow Up:   Antonio Davis MD  [id number]  [phone]    In 1 week         Diet:   Cardiac    Activity:   As tolerated  Avoid heavy lifting or straining    Discharge Medications:      Medication List      START taking these medications    atorvastatin 40 MG tablet  Commonly known as:  LIPITOR  Take 1 tablet by mouth nightly           Where to Get Your Medications      These medications were sent to [facility] [address], [address] [place] [id number]    Phone:  [phone]   · atorvastatin 40 MG tablet         Time Spent on discharge is  20 mins in patient examination, evaluation, counseling, medication reconciliation, discharge plan and follow up. Electronically signed by   Serena Rogel DO  5/6/2019  10:27 PM      Thank you Dr. Ai Mcgill MD for the opportunity to be involved in this patient's care.

## 2019-08-16 ENCOUNTER — HOSPITAL ENCOUNTER (OUTPATIENT)
Dept: MRI IMAGING | Age: 54
Discharge: HOME | End: 2019-08-18
Payer: MEDICARE

## 2019-08-16 DIAGNOSIS — R22.2 PARASPINAL MASS: ICD-10-CM

## 2019-08-16 PROCEDURE — 6360000004 HC RX CONTRAST MEDICATION: Performed by: INTERNAL MEDICINE

## 2019-08-16 PROCEDURE — 72157 MRI CHEST SPINE W/O & W/DYE: CPT

## 2019-08-16 PROCEDURE — A9579 GAD-BASE MR CONTRAST NOS,1ML: HCPCS | Performed by: INTERNAL MEDICINE

## 2019-08-16 RX ADMIN — GADOTERIDOL 17 ML: 279.3 INJECTION, SOLUTION INTRAVENOUS at 16:56

## 2020-07-08 ENCOUNTER — HOSPITAL ENCOUNTER (EMERGENCY)
Age: 55
Discharge: HOME | End: 2020-07-09
Attending: EMERGENCY MEDICINE
Payer: COMMERCIAL

## 2020-07-08 ENCOUNTER — APPOINTMENT (OUTPATIENT)
Dept: CT IMAGING | Age: 55
End: 2020-07-08
Payer: COMMERCIAL

## 2020-07-08 VITALS
OXYGEN SATURATION: 98 % | RESPIRATION RATE: 16 BRPM | DIASTOLIC BLOOD PRESSURE: 85 MMHG | HEART RATE: 64 BPM | HEIGHT: 64 IN | WEIGHT: 167.25 LBS | BODY MASS INDEX: 28.55 KG/M2 | TEMPERATURE: 98.4 F | SYSTOLIC BLOOD PRESSURE: 149 MMHG

## 2020-07-08 LAB
ABSOLUTE EOS #: 0.28 K/UL (ref 0–0.44)
ABSOLUTE IMMATURE GRANULOCYTE: 0.02 K/UL (ref 0–0.3)
ABSOLUTE LYMPH #: 2.81 K/UL (ref 1.1–3.7)
ABSOLUTE MONO #: 0.7 K/UL (ref 0.1–1.2)
ANION GAP SERPL CALCULATED.3IONS-SCNC: 9 MMOL/L (ref 9–17)
BASOPHILS # BLD: 1 % (ref 0–2)
BASOPHILS ABSOLUTE: 0.04 K/UL (ref 0–0.2)
BUN BLDV-MCNC: 16 MG/DL (ref 6–20)
BUN/CREAT BLD: 30 (ref 9–20)
CALCIUM SERPL-MCNC: 8.8 MG/DL (ref 8.6–10.4)
CHLORIDE BLD-SCNC: 107 MMOL/L (ref 98–107)
CO2: 24 MMOL/L (ref 20–31)
CREAT SERPL-MCNC: 0.53 MG/DL (ref 0.7–1.2)
DIFFERENTIAL TYPE: NORMAL
EOSINOPHILS RELATIVE PERCENT: 4 % (ref 1–4)
GFR AFRICAN AMERICAN: >60 ML/MIN
GFR NON-AFRICAN AMERICAN: >60 ML/MIN
GFR SERPL CREATININE-BSD FRML MDRD: ABNORMAL ML/MIN/{1.73_M2}
GFR SERPL CREATININE-BSD FRML MDRD: ABNORMAL ML/MIN/{1.73_M2}
GLUCOSE BLD-MCNC: 106 MG/DL (ref 70–99)
HCT VFR BLD CALC: 41.8 % (ref 40.7–50.3)
HEMOGLOBIN: 13.8 G/DL (ref 13–17)
IMMATURE GRANULOCYTES: 0 %
LYMPHOCYTES # BLD: 40 % (ref 24–43)
MCH RBC QN AUTO: 31.4 PG (ref 25.2–33.5)
MCHC RBC AUTO-ENTMCNC: 33 G/DL (ref 28.4–34.8)
MCV RBC AUTO: 95 FL (ref 82.6–102.9)
MONOCYTES # BLD: 10 % (ref 3–12)
NRBC AUTOMATED: 0 PER 100 WBC
PDW BLD-RTO: 11.9 % (ref 11.8–14.4)
PLATELET # BLD: 269 K/UL (ref 138–453)
PLATELET ESTIMATE: NORMAL
PMV BLD AUTO: 8.3 FL (ref 8.1–13.5)
POTASSIUM SERPL-SCNC: 3.6 MMOL/L (ref 3.7–5.3)
RBC # BLD: 4.4 M/UL (ref 4.21–5.77)
RBC # BLD: NORMAL 10*6/UL
SEG NEUTROPHILS: 45 % (ref 36–65)
SEGMENTED NEUTROPHILS ABSOLUTE COUNT: 3.19 K/UL (ref 1.5–8.1)
SODIUM BLD-SCNC: 140 MMOL/L (ref 135–144)
WBC # BLD: 7 K/UL (ref 3.5–11.3)
WBC # BLD: NORMAL 10*3/UL

## 2020-07-08 PROCEDURE — 72125 CT NECK SPINE W/O DYE: CPT

## 2020-07-08 PROCEDURE — 70450 CT HEAD/BRAIN W/O DYE: CPT

## 2020-07-08 PROCEDURE — 99285 EMERGENCY DEPT VISIT HI MDM: CPT

## 2020-07-08 PROCEDURE — 80048 BASIC METABOLIC PNL TOTAL CA: CPT

## 2020-07-08 PROCEDURE — 85025 COMPLETE CBC W/AUTO DIFF WBC: CPT

## 2020-07-08 ASSESSMENT — ENCOUNTER SYMPTOMS
ABDOMINAL PAIN: 0
VOICE CHANGE: 0
COLOR CHANGE: 0
DIARRHEA: 0
BACK PAIN: 1
SORE THROAT: 0
SHORTNESS OF BREATH: 0
RHINORRHEA: 0
TROUBLE SWALLOWING: 0
EYE PAIN: 0
NAUSEA: 0
VOMITING: 0
COUGH: 0
PHOTOPHOBIA: 0

## 2020-07-08 ASSESSMENT — PAIN DESCRIPTION - PAIN TYPE: TYPE: ACUTE PAIN

## 2020-07-08 ASSESSMENT — PAIN SCALES - GENERAL: PAINLEVEL_OUTOF10: 6

## 2020-07-08 ASSESSMENT — PAIN DESCRIPTION - LOCATION: LOCATION: BACK

## 2020-07-08 NOTE — LETTER
[facility] ED  [address] [id number]  Phone: [phone]             July 8, 2020    Patient: Mitzi Cuenca   YOB: 1965   Date of Visit: 7/8/2020       To Whom It May Concern:    Mitzi Cuenca was seen and treated in our emergency department on 7/8/2020. Please excuse him from work 7/8/2020 and 7/9/2020.     Sincerely,             Signature:__________________________________

## 2020-07-09 NOTE — ED NOTES
Pt presents to the er c/o a two day history of low mid back pain right arm tingling and right leg numbness pts hand grasps and dpfs are equal bilaterally no other neuro deficit noted     Annette Juan RN  07/08/20 8147

## 2020-07-09 NOTE — ED PROVIDER NOTES
of breath. Cardiovascular: Negative for chest pain and palpitations. Gastrointestinal: Negative for abdominal pain, diarrhea, nausea and vomiting. Genitourinary: Negative for dysuria, frequency, hematuria and urgency. Musculoskeletal: Positive for back pain. Negative for arthralgias, gait problem, myalgias, neck pain and neck stiffness. Skin: Negative for color change, rash and wound. Neurological: Positive for weakness. Negative for dizziness, syncope, facial asymmetry, speech difficulty, light-headedness, numbness and headaches. Psychiatric/Behavioral: Negative for confusion. Except as noted above the remainder of the review of systems was reviewed and negative. PHYSICAL EXAM    (up to 7 for level 4, 8 or more for level 5)     ED Triage Vitals [07/08/20 2111]   BP Temp Temp Source Pulse Resp SpO2 Height Weight   (!) 149/85 98.4 °F (36.9 °C) Oral 64 16 98 % 5' 4\" (1.626 m) 167 lb 4 oz (75.9 kg)     Physical Exam  Vitals signs reviewed. Constitutional:       General: He is not in acute distress. Appearance: He is well-developed. He is not diaphoretic. HENT:      Right Ear: External ear normal.      Left Ear: External ear normal.   Eyes:      Extraocular Movements: Extraocular movements intact. Conjunctiva/sclera: Conjunctivae normal.      Pupils: Pupils are equal, round, and reactive to light. Neck:      Musculoskeletal: Neck supple. Cardiovascular:      Rate and Rhythm: Normal rate and regular rhythm. Pulses: Normal pulses. Pulmonary:      Effort: Pulmonary effort is normal. No respiratory distress. Abdominal:      Palpations: Abdomen is soft. Tenderness: There is no abdominal tenderness. Musculoskeletal:      Thoracic back: He exhibits no tenderness, no bony tenderness and no pain. Lumbar back: He exhibits pain. He exhibits no tenderness, no bony tenderness, no swelling and no deformity. Skin:     General: Skin is warm and dry.       Capillary Refill: Capillary refill takes less than 2 seconds. Findings: No rash. Neurological:      General: No focal deficit present. Mental Status: He is alert and oriented to person, place, and time. GCS: GCS eye subscore is 4. GCS verbal subscore is 5. GCS motor subscore is 6. Cranial Nerves: Cranial nerves are intact. No cranial nerve deficit. Motor: Motor function is intact. No weakness. Coordination: Coordination is intact. Gait: Gait is intact. Gait normal.      Comments: No appreciable weakness noted. Psychiatric:         Behavior: Behavior normal.         DIAGNOSTIC RESULTS     RADIOLOGY:   Non-plain film images such as CT, Ultrasound and MRI are read by the radiologist. Plain radiographic images are visualized and preliminarily interpreted by the emergency physician with the below findings:    Interpretation per the Radiologist below, if available at the time of this note:    Ct Head Wo Contrast    Result Date: 7/8/2020  EXAMINATION: CT OF THE HEAD WITHOUT CONTRAST  7/8/2020 10:38 pm TECHNIQUE: CT of the head was performed without the administration of intravenous contrast. Dose modulation, iterative reconstruction, and/or weight based adjustment of the mA/kV was utilized to reduce the radiation dose to as low as reasonably achievable. COMPARISON: None. HISTORY: ORDERING SYSTEM PROVIDED HISTORY: weakness RUE and RLE x2-3 days TECHNOLOGIST PROVIDED HISTORY: weakness RUE and RLE x2-3 days Reason for Exam: rt lower extremity weakness, paraesthesia in rt arm x 2 days Acuity: Acute Type of Exam: Initial Additional signs and symptoms: tates rt lower extremity weakness, paraesthesia in rt arm x 2 days FINDINGS: BRAIN/VENTRICLES: There is no acute intracranial hemorrhage, mass effect or midline shift. No abnormal extra-axial fluid collection. The gray-white differentiation is maintained without evidence of an acute infarct. There is no evidence of hydrocephalus.  ORBITS: The visualized portion of the orbits demonstrate no acute abnormality. SINUSES: The visualized paranasal sinuses and mastoid air cells demonstrate no acute abnormality. SOFT TISSUES/SKULL:  No acute abnormality of the visualized skull or soft tissues. No acute intracranial abnormality. Ct Cervical Spine Wo Contrast    Result Date: 7/8/2020  EXAMINATION: CT OF THE CERVICAL SPINE WITHOUT CONTRAST 7/8/2020 10:38 pm TECHNIQUE: CT of the cervical spine was performed without the administration of intravenous contrast. Multiplanar reformatted images are provided for review. Dose modulation, iterative reconstruction, and/or weight based adjustment of the mA/kV was utilized to reduce the radiation dose to as low as reasonably achievable. COMPARISON: None. HISTORY: ORDERING SYSTEM PROVIDED HISTORY: weakness TECHNOLOGIST PROVIDED HISTORY: weakness Reason for Exam: tates rt lower extremity weakness, paraesthesia in rt arm x 2 days Acuity: Acute Type of Exam: Initial Additional signs and symptoms: rt lower extremity weakness, paraesthesia in rt arm x 2 days FINDINGS: BONES/ALIGNMENT: There is no acute fracture or traumatic malalignment. DEGENERATIVE CHANGES: Severe degenerate changes C5 through C7. Moderate severe degenerate changes elsewhere within the cervical spine. There is severe canal stenosis C5-C6. SOFT TISSUES: There is no prevertebral soft tissue swelling. No acute fracture or traumatic malalignment Moderate severe multilevel degenerate change. LABS:  Labs Reviewed   BASIC METABOLIC PANEL - Abnormal; Notable for the following components:       Result Value    Glucose 106 (*)     CREATININE 0.53 (*)     Bun/Cre Ratio 30 (*)     Potassium 3.6 (*)     All other components within normal limits   CBC WITH AUTO DIFFERENTIAL   URINALYSIS       All other labs were within normal range or not returned as of this dictation.     EMERGENCY DEPARTMENT COURSE and DIFFERENTIAL DIAGNOSIS/MDM:   Vitals:    Vitals:    07/08/20 2111 BP: (!) 149/85   Pulse: 64   Resp: 16   Temp: 98.4 °F (36.9 °C)   TempSrc: Oral   SpO2: 98%   Weight: 167 lb 4 oz (75.9 kg)   Height: 5' 4\" (1.626 m)       CLINICAL DECISION MAKING:  The patient presented alert with a nontoxic appearance and was seen in conjunction with Dr. Vanegas. Imaging was negative for acute findings. Laboratory studies were unremarkable. Follow up with pcp for further evaluation and treatment, return to ED if condition worsens. FINAL IMPRESSION      1. Weakness    2.  Acute left-sided low back pain without sciatica            Problem List  Patient Active Problem List   Diagnosis Code    Abnormal EKG R94.31    Dyspnea R06.00    Mass of chest wall, right R22.2    Musculoskeletal chest pain R07.89    Mixed hyperlipidemia E78.2    Paraspinal mass  R22.2    Chest pain R07.9         DISPOSITION/PLAN   DISPOSITION Decision To Discharge 07/08/2020 11:50:04 PM      PATIENT REFERRED TO:   Nelly Sabillon MD  [address] [place] [address]  [phone]    Schedule an appointment as soon as possible for a visit       [facility] [place] ED  [address] [facility]  [phone]          DISCHARGE MEDICATIONS:     New Prescriptions    No medications on file           (Please note that portions of this note were completed with a voice recognition program.  Efforts were made to edit the dictations but occasionally words are mis-transcribed.)    UZIEL Ledesma CNP, APRN - CNP  07/08/20 0878

## 2022-01-18 ENCOUNTER — HOSPITAL ENCOUNTER (EMERGENCY)
Age: 57
Discharge: HOME | End: 2022-01-18
Attending: EMERGENCY MEDICINE
Payer: COMMERCIAL

## 2022-01-18 VITALS
WEIGHT: 156 LBS | OXYGEN SATURATION: 98 % | TEMPERATURE: 98 F | BODY MASS INDEX: 26.63 KG/M2 | HEIGHT: 64 IN | SYSTOLIC BLOOD PRESSURE: 138 MMHG | RESPIRATION RATE: 14 BRPM | DIASTOLIC BLOOD PRESSURE: 88 MMHG | HEART RATE: 68 BPM

## 2022-01-18 DIAGNOSIS — R51.9 ACUTE NONINTRACTABLE HEADACHE, UNSPECIFIED HEADACHE TYPE: Primary | ICD-10-CM

## 2022-01-18 DIAGNOSIS — Z86.16 PERSONAL HISTORY OF COVID-19: ICD-10-CM

## 2022-01-18 LAB
ABSOLUTE EOS #: 0.13 K/UL (ref 0–0.44)
ABSOLUTE IMMATURE GRANULOCYTE: 0.05 K/UL (ref 0–0.3)
ABSOLUTE LYMPH #: 2.13 K/UL (ref 1.1–3.7)
ABSOLUTE MONO #: 0.89 K/UL (ref 0.1–1.2)
ANION GAP SERPL CALCULATED.3IONS-SCNC: 9 MMOL/L (ref 9–17)
BASOPHILS # BLD: 0 % (ref 0–2)
BASOPHILS ABSOLUTE: 0.03 K/UL (ref 0–0.2)
BUN BLDV-MCNC: 18 MG/DL (ref 6–20)
BUN/CREAT BLD: 32 (ref 9–20)
CALCIUM SERPL-MCNC: 9.2 MG/DL (ref 8.6–10.4)
CHLORIDE BLD-SCNC: 103 MMOL/L (ref 98–107)
CO2: 24 MMOL/L (ref 20–31)
CREAT SERPL-MCNC: 0.56 MG/DL (ref 0.7–1.2)
DIFFERENTIAL TYPE: ABNORMAL
EOSINOPHILS RELATIVE PERCENT: 2 % (ref 1–4)
GFR AFRICAN AMERICAN: >60 ML/MIN
GFR NON-AFRICAN AMERICAN: >60 ML/MIN
GFR SERPL CREATININE-BSD FRML MDRD: ABNORMAL ML/MIN/{1.73_M2}
GFR SERPL CREATININE-BSD FRML MDRD: ABNORMAL ML/MIN/{1.73_M2}
GLUCOSE BLD-MCNC: 101 MG/DL (ref 70–99)
HCT VFR BLD CALC: 42.9 % (ref 40.7–50.3)
HEMOGLOBIN: 14.3 G/DL (ref 13–17)
IMMATURE GRANULOCYTES: 1 %
LYMPHOCYTES # BLD: 30 % (ref 24–43)
MCH RBC QN AUTO: 30.6 PG (ref 25.2–33.5)
MCHC RBC AUTO-ENTMCNC: 33.3 G/DL (ref 28.4–34.8)
MCV RBC AUTO: 91.9 FL (ref 82.6–102.9)
MONOCYTES # BLD: 13 % (ref 3–12)
NRBC AUTOMATED: 0 PER 100 WBC
PDW BLD-RTO: 11.8 % (ref 11.8–14.4)
PLATELET # BLD: 334 K/UL (ref 138–453)
PLATELET ESTIMATE: ABNORMAL
PMV BLD AUTO: 8 FL (ref 8.1–13.5)
POTASSIUM SERPL-SCNC: 4.2 MMOL/L (ref 3.7–5.3)
RBC # BLD: 4.67 M/UL (ref 4.21–5.77)
RBC # BLD: ABNORMAL 10*6/UL
SEG NEUTROPHILS: 54 % (ref 36–65)
SEGMENTED NEUTROPHILS ABSOLUTE COUNT: 3.81 K/UL (ref 1.5–8.1)
SODIUM BLD-SCNC: 136 MMOL/L (ref 135–144)
WBC # BLD: 7 K/UL (ref 3.5–11.3)
WBC # BLD: ABNORMAL 10*3/UL

## 2022-01-18 PROCEDURE — 6360000002 HC RX W HCPCS: Performed by: NURSE PRACTITIONER

## 2022-01-18 PROCEDURE — 85025 COMPLETE CBC W/AUTO DIFF WBC: CPT

## 2022-01-18 PROCEDURE — 99282 EMERGENCY DEPT VISIT SF MDM: CPT

## 2022-01-18 PROCEDURE — 96375 TX/PRO/DX INJ NEW DRUG ADDON: CPT

## 2022-01-18 PROCEDURE — 96374 THER/PROPH/DIAG INJ IV PUSH: CPT

## 2022-01-18 PROCEDURE — 2580000003 HC RX 258: Performed by: NURSE PRACTITIONER

## 2022-01-18 PROCEDURE — 80048 BASIC METABOLIC PNL TOTAL CA: CPT

## 2022-01-18 RX ORDER — KETOROLAC TROMETHAMINE 30 MG/ML
15 INJECTION, SOLUTION INTRAMUSCULAR; INTRAVENOUS ONCE
Status: COMPLETED | OUTPATIENT
Start: 2022-01-18 | End: 2022-01-18

## 2022-01-18 RX ORDER — ONDANSETRON 2 MG/ML
4 INJECTION INTRAMUSCULAR; INTRAVENOUS ONCE
Status: COMPLETED | OUTPATIENT
Start: 2022-01-18 | End: 2022-01-18

## 2022-01-18 RX ORDER — ATORVASTATIN CALCIUM 40 MG/1
TABLET, FILM COATED ORAL
COMMUNITY
Start: 2021-12-13

## 2022-01-18 RX ORDER — DEXAMETHASONE SODIUM PHOSPHATE 10 MG/ML
8 INJECTION, SOLUTION INTRAMUSCULAR; INTRAVENOUS ONCE
Status: COMPLETED | OUTPATIENT
Start: 2022-01-18 | End: 2022-01-18

## 2022-01-18 RX ORDER — 0.9 % SODIUM CHLORIDE 0.9 %
1000 INTRAVENOUS SOLUTION INTRAVENOUS ONCE
Status: COMPLETED | OUTPATIENT
Start: 2022-01-18 | End: 2022-01-18

## 2022-01-18 RX ORDER — BUTALBITAL, ACETAMINOPHEN AND CAFFEINE 50; 325; 40 MG/1; MG/1; MG/1
1 TABLET ORAL EVERY 4 HOURS PRN
Qty: 10 TABLET | Refills: 0 | Status: ON HOLD | OUTPATIENT
Start: 2022-01-18 | End: 2022-03-14

## 2022-01-18 RX ADMIN — KETOROLAC TROMETHAMINE 15 MG: 30 INJECTION, SOLUTION INTRAMUSCULAR; INTRAVENOUS at 10:13

## 2022-01-18 RX ADMIN — SODIUM CHLORIDE 1000 ML: 9 INJECTION, SOLUTION INTRAVENOUS at 10:12

## 2022-01-18 RX ADMIN — ONDANSETRON 4 MG: 2 INJECTION INTRAMUSCULAR; INTRAVENOUS at 10:13

## 2022-01-18 RX ADMIN — DEXAMETHASONE SODIUM PHOSPHATE 8 MG: 10 INJECTION, SOLUTION INTRAMUSCULAR; INTRAVENOUS at 10:13

## 2022-01-18 ASSESSMENT — ENCOUNTER SYMPTOMS
ABDOMINAL PAIN: 0
SHORTNESS OF BREATH: 0
COLOR CHANGE: 0
SORE THROAT: 0
DIARRHEA: 0
COUGH: 0

## 2022-01-18 ASSESSMENT — PAIN SCALES - GENERAL
PAINLEVEL_OUTOF10: 4
PAINLEVEL_OUTOF10: 4

## 2022-01-18 NOTE — ED NOTES
Pt to er with c/o headache and fatigue. Pt states he has been dealing with covid. Pt denies sob. Pt denies fever. Pt a&ox3. Skin warm and dry. Respirations even and non-labored.       Natalie Garner RN  01/18/22 7591

## 2022-01-18 NOTE — ED PROVIDER NOTES
[facility] ED  eMERGENCY dEPARTMENT eNCOUnter      Pt Name: Yue Rivera  MRN: [id number]  [place] 1965  Date of evaluation: 2022  Provider: UZIEL Poon CNP    CHIEF COMPLAINT       Chief Complaint   Patient presents with    Positive For Covid-19    Headache         HISTORY OF PRESENT ILLNESS  (Location/Symptom, Timing/Onset, Context/Setting, Quality, Duration, Modifying Factors, Severity.)   Yue Rivera is a 60 y.o. male who presents to the emergency department via private auto for chills, fatigue, HA, body aches. Onset was 22. He tested positive for Covid-19 on 22. Denies fever, SOB, cough, diarrhea. He had one episode of emesis a few days ago. Rates his pain 4/10 at this time. Nursing Notes were reviewed. ALLERGIES     Patient has no known allergies. CURRENT MEDICATIONS       Previous Medications    ATORVASTATIN (LIPITOR) 40 MG TABLET    take 1 tablet by mouth once daily       PAST MEDICAL HISTORY         Diagnosis Date    HTN (hypertension)        SURGICAL HISTORY           Procedure Laterality Date    APPENDECTOMY      VASECTOMY           FAMILY HISTORY           Problem Relation Age of Onset    Heart Disease Mother      Family Status   Relation Name Status    Mother          SOCIAL HISTORY      reports that he quit smoking about 22 years ago. He has never used smokeless tobacco. He reports that he does not drink alcohol and does not use drugs. REVIEW OF SYSTEMS    (2-9 systems for level 4, 10 or more for level 5)     Review of Systems   Constitutional: Positive for chills and diaphoresis. Negative for fatigue and fever. HENT: Negative for congestion and sore throat. Respiratory: Negative for cough and shortness of breath. Cardiovascular: Negative for chest pain. Gastrointestinal: Negative for abdominal pain and diarrhea. Musculoskeletal: Positive for arthralgias and myalgias.    Skin: Negative for color change, rash and wound.   Neurological: Positive for headaches. Negative for dizziness, syncope, facial asymmetry, speech difficulty, weakness, light-headedness and numbness. Psychiatric/Behavioral: Negative for confusion. Except as noted above the remainder of the review of systems was reviewed and negative. PHYSICAL EXAM    (up to 7 for level 4, 8 or more for level 5)     ED Triage Vitals [01/18/22 0923]   BP Temp Temp Source Pulse Resp SpO2 Height Weight   (!) 166/108 98 °F (36.7 °C) Oral 68 14 98 % 5' 4\" (1.626 m) 156 lb (70.8 kg)     Physical Exam  Vitals reviewed. Constitutional:       General: He is not in acute distress. Appearance: He is well-developed. He is not diaphoretic. HENT:      Right Ear: External ear normal.      Left Ear: External ear normal.   Eyes:      General: No scleral icterus. Extraocular Movements: Extraocular movements intact. Conjunctiva/sclera: Conjunctivae normal.      Pupils: Pupils are equal, round, and reactive to light. Cardiovascular:      Rate and Rhythm: Normal rate. Pulmonary:      Effort: Pulmonary effort is normal. No respiratory distress. Breath sounds: No stridor. Musculoskeletal:      Cervical back: Neck supple. No rigidity or tenderness. Comments: Moves extremities. Skin:     General: Skin is warm and dry. Findings: No rash. Neurological:      Mental Status: He is alert and oriented to person, place, and time. GCS: GCS eye subscore is 4. GCS verbal subscore is 5. GCS motor subscore is 6. Cranial Nerves: Cranial nerves are intact. Motor: Motor function is intact. Coordination: Coordination is intact. Gait: Gait is intact.    Psychiatric:         Behavior: Behavior normal.           DIAGNOSTIC RESULTS       LABS:  Labs Reviewed   CBC WITH AUTO DIFFERENTIAL - Abnormal; Notable for the following components:       Result Value    MPV 8.0 (*)     Monocytes 13 (*)     Immature Granulocytes 1 (*)     All other components within normal limits   BASIC METABOLIC PANEL - Abnormal; Notable for the following components:    Glucose 101 (*)     CREATININE 0.56 (*)     Bun/Cre Ratio 32 (*)     All other components within normal limits       All other labs were within normal range or not returned as of this dictation. EMERGENCY DEPARTMENT COURSE and DIFFERENTIAL DIAGNOSIS/MDM:   Vitals:    Vitals:    01/18/22 0923 01/18/22 1124   BP: (!) 166/108 138/88   Pulse: 68    Resp: 14    Temp: 98 °F (36.7 °C)    TempSrc: Oral    SpO2: 98%    Weight: 156 lb (70.8 kg)    Height: 5' 4\" (1.626 m)          MEDICATIONS GIVEN IN THE ED:  Medications   0.9 % sodium chloride bolus (0 mLs IntraVENous Stopped 1/18/22 1124)   ondansetron (ZOFRAN) injection 4 mg (4 mg IntraVENous Given 1/18/22 1013)   ketorolac (TORADOL) injection 15 mg (15 mg IntraVENous Given 1/18/22 1013)   dexamethasone (PF) (DECADRON) injection 8 mg (8 mg IntraVENous Given 1/18/22 1013)       CLINICAL DECISION MAKING:  The patient presented alert with a nontoxic appearance and was seen in conjunction with Dr. Libia Downey. Laboratory studies were unremarkable. VS were unremarkable. He was given medication with improvement. A prescription was written for Fioricet. Follow up with pcp for a recheck, further evaluation and treatment. Evaluation and treatment course in the ED, and plan of care upon discharge was discussed in length with the patient. Patient had no further questions prior to being discharged and was instructed to return to the ED for new or worsening symptoms. Care was provided during an unprecedented national emergency due to the novel coronavirus, Covid-19. At this time there is significant evidence of Covid-19 community spread due to this pandemic. Direct patient contact is avoided at this time due to the critically low supplies of PPE worldwide and an effort to joseph appropriate use of PPE.   I performed a medical screening exam that is compliant with the Declaration of [employer] Emergency in the [place], secondary to the Pandemic Infectious Disease of SARS-Coronavirus-2. FINAL IMPRESSION      1. Acute nonintractable headache, unspecified headache type    2.  Personal history of COVID-19            Problem List  Patient Active Problem List   Diagnosis Code    Abnormal EKG R94.31    Dyspnea R06.00    Mass of chest wall, right R22.2    Musculoskeletal chest pain R07.89    Mixed hyperlipidemia E78.2    Paraspinal mass  R22.2    Chest pain R07.9         DISPOSITION/PLAN   DISPOSITION Decision To Discharge 01/18/2022 11:06:58 AM      PATIENT REFERRED TO:   Darrick mAin MD  [address] [place] [address]  [phone]    Schedule an appointment as soon as possible for a visit       [facility] ED  [address] [facility]  [phone]    If symptoms worsen, As needed      DISCHARGE MEDICATIONS:     New Prescriptions    BUTALBITAL-ACETAMINOPHEN-CAFFEINE (FIORICET, ESGIC) -40 MG PER TABLET    Take 1 tablet by mouth every 4 hours as needed for Headaches           (Please note that portions of this note were completed with a voice recognition program.  Efforts were made to edit the dictations but occasionally words are mis-transcribed.)    UZIEL Altamirano - UZIEL Carbajal - CNP  01/18/22 39 UZIEL Yoo - CNP  01/18/22 4499

## 2022-01-24 NOTE — ED PROVIDER NOTES
eMERGENCY dEPARTMENT eNCOUnter   Independent Attestation     Pt Name: Charlie Jean  MRN: [id number]  [place] 1965  Date of evaluation: 1/24/22     Charlie Jean is a 60 y.o. male with CC: Positive For Covid-19 and Headache        This visit was performed by both a physician and an APC. I performed all aspects of the MDM as documented. The care is provided during an unprecedented national emergency due to the novel coronavirus, COVID 19.     Mitch Marrero MD  Attending Emergency Physician            Mitch Marrero MD  01/24/22 8857

## 2022-03-14 ENCOUNTER — APPOINTMENT (OUTPATIENT)
Dept: GENERAL RADIOLOGY | Age: 57
End: 2022-03-14
Payer: COMMERCIAL

## 2022-03-14 ENCOUNTER — HOSPITAL ENCOUNTER (OUTPATIENT)
Age: 57
Setting detail: OBSERVATION
Discharge: HOME | End: 2022-03-15
Attending: EMERGENCY MEDICINE | Admitting: FAMILY MEDICINE
Payer: COMMERCIAL

## 2022-03-14 DIAGNOSIS — F51.01 PRIMARY INSOMNIA: ICD-10-CM

## 2022-03-14 DIAGNOSIS — R07.9 CHEST PAIN, UNSPECIFIED TYPE: Primary | ICD-10-CM

## 2022-03-14 LAB
ABSOLUTE EOS #: <0.03 K/UL (ref 0–0.44)
ABSOLUTE IMMATURE GRANULOCYTE: 0.01 K/UL (ref 0–0.3)
ABSOLUTE LYMPH #: 1.33 K/UL (ref 1.1–3.7)
ABSOLUTE MONO #: 1.34 K/UL (ref 0.1–1.2)
ANION GAP SERPL CALCULATED.3IONS-SCNC: 13 MMOL/L (ref 9–17)
BASOPHILS # BLD: 1 % (ref 0–2)
BASOPHILS ABSOLUTE: 0.03 K/UL (ref 0–0.2)
BUN BLDV-MCNC: 15 MG/DL (ref 6–20)
BUN/CREAT BLD: 21 (ref 9–20)
CALCIUM SERPL-MCNC: 9.5 MG/DL (ref 8.6–10.4)
CHLORIDE BLD-SCNC: 99 MMOL/L (ref 98–107)
CO2: 23 MMOL/L (ref 20–31)
CREAT SERPL-MCNC: 0.71 MG/DL (ref 0.7–1.2)
EOSINOPHILS RELATIVE PERCENT: 0 % (ref 1–4)
GFR AFRICAN AMERICAN: >60 ML/MIN
GFR NON-AFRICAN AMERICAN: >60 ML/MIN
GFR SERPL CREATININE-BSD FRML MDRD: ABNORMAL ML/MIN/{1.73_M2}
GLUCOSE BLD-MCNC: 125 MG/DL (ref 70–99)
HCT VFR BLD CALC: 47.4 % (ref 40.7–50.3)
HEMOGLOBIN: 15.9 G/DL (ref 13–17)
IMMATURE GRANULOCYTES: 0 %
LYMPHOCYTES # BLD: 28 % (ref 24–43)
MCH RBC QN AUTO: 31.4 PG (ref 25.2–33.5)
MCHC RBC AUTO-ENTMCNC: 33.5 G/DL (ref 28.4–34.8)
MCV RBC AUTO: 93.7 FL (ref 82.6–102.9)
MONOCYTES # BLD: 28 % (ref 3–12)
MYOGLOBIN: 44 NG/ML (ref 28–72)
MYOGLOBIN: 61 NG/ML (ref 28–72)
NRBC AUTOMATED: 0 PER 100 WBC
PDW BLD-RTO: 12.5 % (ref 11.8–14.4)
PLATELET # BLD: 253 K/UL (ref 138–453)
PMV BLD AUTO: 8.6 FL (ref 8.1–13.5)
POTASSIUM SERPL-SCNC: 3.8 MMOL/L (ref 3.7–5.3)
RBC # BLD: 5.06 M/UL (ref 4.21–5.77)
SEG NEUTROPHILS: 43 % (ref 36–65)
SEGMENTED NEUTROPHILS ABSOLUTE COUNT: 2.09 K/UL (ref 1.5–8.1)
SODIUM BLD-SCNC: 135 MMOL/L (ref 135–144)
TROPONIN, HIGH SENSITIVITY: 10 NG/L (ref 0–22)
TROPONIN, HIGH SENSITIVITY: 10 NG/L (ref 0–22)
TROPONIN, HIGH SENSITIVITY: 8 NG/L (ref 0–22)
TROPONIN, HIGH SENSITIVITY: 8 NG/L (ref 0–22)
WBC # BLD: 4.8 K/UL (ref 3.5–11.3)

## 2022-03-14 PROCEDURE — 84484 ASSAY OF TROPONIN QUANT: CPT

## 2022-03-14 PROCEDURE — 83874 ASSAY OF MYOGLOBIN: CPT

## 2022-03-14 PROCEDURE — G0378 HOSPITAL OBSERVATION PER HR: HCPCS

## 2022-03-14 PROCEDURE — 80048 BASIC METABOLIC PNL TOTAL CA: CPT

## 2022-03-14 PROCEDURE — 93005 ELECTROCARDIOGRAM TRACING: CPT | Performed by: EMERGENCY MEDICINE

## 2022-03-14 PROCEDURE — 99285 EMERGENCY DEPT VISIT HI MDM: CPT

## 2022-03-14 PROCEDURE — 6370000000 HC RX 637 (ALT 250 FOR IP): Performed by: EMERGENCY MEDICINE

## 2022-03-14 PROCEDURE — 6370000000 HC RX 637 (ALT 250 FOR IP): Performed by: FAMILY MEDICINE

## 2022-03-14 PROCEDURE — 2580000003 HC RX 258: Performed by: FAMILY MEDICINE

## 2022-03-14 PROCEDURE — 85025 COMPLETE CBC W/AUTO DIFF WBC: CPT

## 2022-03-14 PROCEDURE — 71045 X-RAY EXAM CHEST 1 VIEW: CPT

## 2022-03-14 PROCEDURE — 36415 COLL VENOUS BLD VENIPUNCTURE: CPT

## 2022-03-14 RX ORDER — ACETAMINOPHEN 650 MG/1
650 SUPPOSITORY RECTAL EVERY 6 HOURS PRN
Status: DISCONTINUED | OUTPATIENT
Start: 2022-03-14 | End: 2022-03-16 | Stop reason: HOSPADM

## 2022-03-14 RX ORDER — ZOLPIDEM TARTRATE 5 MG/1
5 TABLET ORAL NIGHTLY PRN
Status: DISCONTINUED | OUTPATIENT
Start: 2022-03-14 | End: 2022-03-16 | Stop reason: HOSPADM

## 2022-03-14 RX ORDER — NITROGLYCERIN 0.4 MG/1
0.4 TABLET SUBLINGUAL EVERY 5 MIN PRN
Status: DISCONTINUED | OUTPATIENT
Start: 2022-03-14 | End: 2022-03-15 | Stop reason: SDUPTHER

## 2022-03-14 RX ORDER — ONDANSETRON 2 MG/ML
4 INJECTION INTRAMUSCULAR; INTRAVENOUS EVERY 6 HOURS PRN
Status: DISCONTINUED | OUTPATIENT
Start: 2022-03-14 | End: 2022-03-16 | Stop reason: HOSPADM

## 2022-03-14 RX ORDER — NITROGLYCERIN 0.4 MG/1
0.4 TABLET SUBLINGUAL EVERY 5 MIN PRN
Status: DISCONTINUED | OUTPATIENT
Start: 2022-03-14 | End: 2022-03-16 | Stop reason: HOSPADM

## 2022-03-14 RX ORDER — SODIUM CHLORIDE 0.9 % (FLUSH) 0.9 %
5-40 SYRINGE (ML) INJECTION PRN
Status: DISCONTINUED | OUTPATIENT
Start: 2022-03-14 | End: 2022-03-14 | Stop reason: SDUPTHER

## 2022-03-14 RX ORDER — ACETAMINOPHEN 325 MG/1
650 TABLET ORAL EVERY 6 HOURS PRN
Status: DISCONTINUED | OUTPATIENT
Start: 2022-03-14 | End: 2022-03-16 | Stop reason: HOSPADM

## 2022-03-14 RX ORDER — BUTALBITAL, ACETAMINOPHEN AND CAFFEINE 50; 325; 40 MG/1; MG/1; MG/1
1 TABLET ORAL EVERY 4 HOURS PRN
Status: DISCONTINUED | OUTPATIENT
Start: 2022-03-14 | End: 2022-03-16 | Stop reason: HOSPADM

## 2022-03-14 RX ORDER — SODIUM CHLORIDE 9 MG/ML
25 INJECTION, SOLUTION INTRAVENOUS PRN
Status: DISCONTINUED | OUTPATIENT
Start: 2022-03-14 | End: 2022-03-14 | Stop reason: SDUPTHER

## 2022-03-14 RX ORDER — ATORVASTATIN CALCIUM 40 MG/1
40 TABLET, FILM COATED ORAL NIGHTLY
Status: DISCONTINUED | OUTPATIENT
Start: 2022-03-14 | End: 2022-03-14 | Stop reason: SDUPTHER

## 2022-03-14 RX ORDER — POTASSIUM CHLORIDE 7.45 MG/ML
10 INJECTION INTRAVENOUS PRN
Status: DISCONTINUED | OUTPATIENT
Start: 2022-03-14 | End: 2022-03-16 | Stop reason: HOSPADM

## 2022-03-14 RX ORDER — ASPIRIN 81 MG/1
324 TABLET, CHEWABLE ORAL ONCE
Status: COMPLETED | OUTPATIENT
Start: 2022-03-14 | End: 2022-03-14

## 2022-03-14 RX ORDER — SODIUM CHLORIDE 9 MG/ML
25 INJECTION, SOLUTION INTRAVENOUS PRN
Status: DISCONTINUED | OUTPATIENT
Start: 2022-03-14 | End: 2022-03-16 | Stop reason: HOSPADM

## 2022-03-14 RX ORDER — SODIUM CHLORIDE 0.9 % (FLUSH) 0.9 %
10 SYRINGE (ML) INJECTION PRN
Status: DISCONTINUED | OUTPATIENT
Start: 2022-03-14 | End: 2022-03-16 | Stop reason: HOSPADM

## 2022-03-14 RX ORDER — NITROGLYCERIN 0.4 MG/1
0.4 TABLET SUBLINGUAL EVERY 5 MIN PRN
Status: COMPLETED | OUTPATIENT
Start: 2022-03-14 | End: 2022-03-14

## 2022-03-14 RX ORDER — VITAMIN E 268 MG
400 CAPSULE ORAL DAILY
Status: ON HOLD | COMMUNITY
End: 2022-03-15 | Stop reason: HOSPADM

## 2022-03-14 RX ORDER — ONDANSETRON 4 MG/1
4 TABLET, ORALLY DISINTEGRATING ORAL EVERY 8 HOURS PRN
Status: DISCONTINUED | OUTPATIENT
Start: 2022-03-14 | End: 2022-03-16 | Stop reason: HOSPADM

## 2022-03-14 RX ORDER — MAGNESIUM SULFATE 1 G/100ML
1000 INJECTION INTRAVENOUS PRN
Status: DISCONTINUED | OUTPATIENT
Start: 2022-03-14 | End: 2022-03-16 | Stop reason: HOSPADM

## 2022-03-14 RX ORDER — POTASSIUM CHLORIDE 7.45 MG/ML
10 INJECTION INTRAVENOUS PRN
Status: DISCONTINUED | OUTPATIENT
Start: 2022-03-14 | End: 2022-03-15 | Stop reason: SDUPTHER

## 2022-03-14 RX ORDER — SODIUM CHLORIDE 0.9 % (FLUSH) 0.9 %
5-40 SYRINGE (ML) INJECTION EVERY 12 HOURS SCHEDULED
Status: DISCONTINUED | OUTPATIENT
Start: 2022-03-14 | End: 2022-03-14 | Stop reason: SDUPTHER

## 2022-03-14 RX ORDER — SODIUM CHLORIDE 0.9 % (FLUSH) 0.9 %
5-40 SYRINGE (ML) INJECTION EVERY 12 HOURS SCHEDULED
Status: DISCONTINUED | OUTPATIENT
Start: 2022-03-14 | End: 2022-03-16 | Stop reason: HOSPADM

## 2022-03-14 RX ORDER — POTASSIUM CHLORIDE 20 MEQ/1
40 TABLET, EXTENDED RELEASE ORAL PRN
Status: DISCONTINUED | OUTPATIENT
Start: 2022-03-14 | End: 2022-03-16 | Stop reason: HOSPADM

## 2022-03-14 RX ORDER — ATORVASTATIN CALCIUM 40 MG/1
40 TABLET, FILM COATED ORAL NIGHTLY
Status: DISCONTINUED | OUTPATIENT
Start: 2022-03-14 | End: 2022-03-16 | Stop reason: HOSPADM

## 2022-03-14 RX ADMIN — Medication 0.4 MG: at 09:25

## 2022-03-14 RX ADMIN — Medication 0.4 MG: at 11:30

## 2022-03-14 RX ADMIN — NITROGLYCERIN 0.4 MG: 0.4 TABLET, ORALLY DISINTEGRATING SUBLINGUAL at 11:36

## 2022-03-14 RX ADMIN — Medication 0.4 MG: at 09:29

## 2022-03-14 RX ADMIN — ATORVASTATIN CALCIUM 40 MG: 40 TABLET, FILM COATED ORAL at 20:47

## 2022-03-14 RX ADMIN — ZOLPIDEM TARTRATE 5 MG: 5 TABLET ORAL at 22:14

## 2022-03-14 RX ADMIN — ASPIRIN 325 MG: 325 TABLET, DELAYED RELEASE ORAL at 16:17

## 2022-03-14 RX ADMIN — SODIUM CHLORIDE, PRESERVATIVE FREE 10 ML: 5 INJECTION INTRAVENOUS at 20:47

## 2022-03-14 RX ADMIN — ASPIRIN 81 MG 324 MG: 81 TABLET ORAL at 09:23

## 2022-03-14 ASSESSMENT — ENCOUNTER SYMPTOMS
COUGH: 0
ABDOMINAL PAIN: 0
SHORTNESS OF BREATH: 0
DIARRHEA: 0
CONSTIPATION: 0
EYE DISCHARGE: 0
FACIAL SWELLING: 0
EYE REDNESS: 0
COLOR CHANGE: 0
VOMITING: 0

## 2022-03-14 NOTE — ED NOTES
Pt states he started having midsternal chest pain today. No Sob. Pt has clear lung sounds to auscultation.       George Foster RN  03/14/22 4494

## 2022-03-14 NOTE — ED NOTES
Pt states is diaphoresis that he has experienced the entire weekend resolved with the pain resolution.      Batsheva Olivo RN  03/14/22 1011

## 2022-03-14 NOTE — ED PROVIDER NOTES
[address] [place] [address] ED  EMERGENCY DEPARTMENT ENCOUNTER      Pt Name: Annika Falcon  MRN: [id number]  [place] 1965  Date of evaluation: 3/14/2022  Provider: Bin Montoya MD    CHIEF COMPLAINT     No chief complaint on file. HISTORY OF PRESENT ILLNESS  (Location/Symptom, Timing/Onset, Context/Setting, Quality, Duration, Modifying Factors, Severity.)   Annika Falcon is a 60 y.o. male who presents to the emergency department for chest pain. He states for the past 2 days he has been sweaty but did not have any pain until 6:00 this morning. It is on the left lower lateral chest area and it feels sharp. Its been continuous. No fever or cough and he does not complain of shortness of breath nausea or abdominal pain. No injury. The pain is moderate. Nursing Notes were reviewed. ALLERGIES     Patient has no known allergies. CURRENT MEDICATIONS       Previous Medications    ATORVASTATIN (LIPITOR) 40 MG TABLET    take 1 tablet by mouth once daily    BUTALBITAL-ACETAMINOPHEN-CAFFEINE (FIORICET, ESGIC) -40 MG PER TABLET    Take 1 tablet by mouth every 4 hours as needed for Headaches       PAST MEDICAL HISTORY         Diagnosis Date    HTN (hypertension)        SURGICAL HISTORY           Procedure Laterality Date    APPENDECTOMY      VASECTOMY           FAMILY HISTORY           Problem Relation Age of Onset    Heart Disease Mother      Family Status   Relation Name Status    Mother          SOCIAL HISTORY      reports that he quit smoking about 22 years ago. He has never used smokeless tobacco. He reports that he does not drink alcohol and does not use drugs. REVIEW OF SYSTEMS    (2-9 systems for level 4, 10 or more for level 5)     Review of Systems   Constitutional: Positive for diaphoresis. Negative for chills, fatigue and fever. HENT: Negative for congestion, ear discharge and facial swelling. Eyes: Negative for discharge and redness.    Respiratory: Negative for cough and shortness of breath. Cardiovascular: Positive for chest pain. Gastrointestinal: Negative for abdominal pain, constipation, diarrhea and vomiting. Genitourinary: Negative for dysuria and hematuria. Musculoskeletal: Negative for arthralgias. Skin: Negative for color change and rash. Neurological: Negative for syncope, numbness and headaches. Hematological: Negative for adenopathy. Psychiatric/Behavioral: Negative for confusion. The patient is not nervous/anxious. Except as noted above the remainder of the review of systems was reviewed and negative. PHYSICAL EXAM    (up to 7 for level 4, 8 or more for level 5)     Vitals:    03/14/22 1000 03/14/22 1007 03/14/22 1030 03/14/22 1031   BP: 116/81  116/85    Pulse: 66 65 77 69   Resp: 19 19 20 24   SpO2:  93% 91% 94%       Physical Exam  Vitals reviewed. Constitutional:       General: He is not in acute distress. Appearance: He is well-developed. He is diaphoretic. HENT:      Head: Normocephalic and atraumatic. Eyes:      General: No scleral icterus. Right eye: No discharge. Left eye: No discharge. Cardiovascular:      Rate and Rhythm: Normal rate and regular rhythm. Pulmonary:      Effort: Pulmonary effort is normal. No respiratory distress. Breath sounds: Normal breath sounds. No stridor. No wheezing or rales. Abdominal:      General: There is no distension. Palpations: Abdomen is soft. Tenderness: There is no abdominal tenderness. Musculoskeletal:         General: Normal range of motion. Cervical back: Neck supple. Lymphadenopathy:      Cervical: No cervical adenopathy. Skin:     General: Skin is warm. Findings: No erythema or rash. Neurological:      Mental Status: He is alert and oriented to person, place, and time.    Psychiatric:         Behavior: Behavior normal.             DIAGNOSTIC RESULTS     EKG: All EKG's are interpreted by the Emergency Department Physician who either signs or Co-signs this chart in the absence of a cardiologist.    EKG my interpretation shows right bundle branch block and left anterior fascicular block. These are new from 3 years ago. No acute ST segment changes    RADIOLOGY:   Non-plain film images such as CT, Ultrasound and MRI are read by the radiologist. Plain radiographic images are visualized and preliminarily interpreted by the emergency physician with the below findings:    Interpretation per the Radiologist below, if available at the time of this note:    XR CHEST PORTABLE    Result Date: 3/14/2022  EXAMINATION: [address] 3/14/2022 8:43 am COMPARISON: Chest radiograph performed 05/05/2019. HISTORY: ORDERING SYSTEM PROVIDED HISTORY: Chest Pain TECHNOLOGIST PROVIDED HISTORY: Chest Pain Reason for Exam: port ap upright sob FINDINGS: There is no acute consolidation or effusion. There is no pneumothorax. The mediastinal structures are unremarkable. The upper abdomen is unremarkable. The extrathoracic soft tissues are unremarkable. There is no acute osseous abnormality. No acute cardiopulmonary process. LABS:  Labs Reviewed   CBC WITH AUTO DIFFERENTIAL - Abnormal; Notable for the following components:       Result Value    Monocytes 28 (*)     Eosinophils % 0 (*)     Absolute Mono # 1.34 (*)     All other components within normal limits   BASIC METABOLIC PANEL - Abnormal; Notable for the following components:    Glucose 125 (*)     Bun/Cre Ratio 21 (*)     All other components within normal limits   TROP/MYOGLOBIN   TROP/MYOGLOBIN       All other labs were within normal range or not returned as of this dictation.     EMERGENCY DEPARTMENT COURSE and DIFFERENTIAL DIAGNOSIS/MDM:   Vitals:    Vitals:    03/14/22 1000 03/14/22 1007 03/14/22 1030 03/14/22 1031   BP: 116/81  116/85    Pulse: 66 65 77 69   Resp: 19 19 20 24   SpO2:  93% 91% 94%       Orders Placed This Encounter   Medications    aspirin chewable tablet 324 mg  nitroGLYCERIN (NITROSTAT) SL tablet 0.4 mg       Medical Decision Makin sets of troponin are negative but his symptoms are concerning and his pain was relieved completely with nitroglycerin. He was also given aspirin. He is being admitted. Treatment diagnosis and disposition were discussed with the patient. CONSULTS:  IP CONSULT TO HOSPITALIST    PROCEDURES:  None    FINAL IMPRESSION      1. Chest pain, unspecified type          DISPOSITION/PLAN   DISPOSITION Decision To Admit 2022 11:16:05 AM      PATIENT REFERRED TO:   No follow-up provider specified. DISCHARGE MEDICATIONS:     New Prescriptions    No medications on file       The care is provided during an unprecedented national emergency due to the novel coronavirus, COVID-19.     (Please note that portions of this note were completed with a voice recognition program.  Efforts were made to edit the dictations but occasionally words are mis-transcribed.)    Ayaan Bailey MD  Attending Emergency Physician           Ayaan Bailey MD  [phone]

## 2022-03-14 NOTE — PLAN OF CARE
Problem: Discharge Planning:  Goal: Discharged to appropriate level of care  Description: Discharged to appropriate level of care  Outcome: Ongoing     Problem: Cardiac Output - Decreased:  Goal: Hemodynamic stability will improve  Description: Hemodynamic stability will improve  Outcome: Ongoing     Problem: Serum Glucose Level - Abnormal:  Goal: Ability to maintain appropriate glucose levels will improve  Description: Ability to maintain appropriate glucose levels will improve  Outcome: Ongoing     Problem: Pain:  Goal: Pain level will decrease  Description: Pain level will decrease  Outcome: Ongoing  Goal: Control of acute pain  Description: Control of acute pain  Outcome: Ongoing

## 2022-03-14 NOTE — H&P
History & Physical  [facility]    Adult Hospitalist      Name: Balta Ortiz  MRN: [id number]     Acct: [de-identified]  Room: [facility]/    Admit Date: 3/14/2022  8:35 AM  PCP: Rodger Banda MD    Primary Problem  Principal Problem:    Chest pain  Resolved Problems:    * No resolved hospital problems. *        Assesment:     · Chest pain, unspecified  · Mixed hyperlipidemia  · Tension headaches  · Overweight        Plan:     · Admit for observation  · Monitor vitals closely  · Keep SPO2 above 90%  · I's and O's  · IV Hep-Lock  · Pain control  · Serial EKG  · Serial cardiac enzymes  · Antiemetics as needed  · Aspirin  · HUMA B statin  · Consult cardiology  · Chest x-ray  · CBC, BMP  · Lipid panel  · Hemoglobin A1c  · DVT and GI prophylaxis. Chief Complaint:     No chief complaint on file. Chest pain    History of Present Illness:      Balta Ortiz is a 60 y.o.  male who presents with No chief complaint on file. Patient admitted from the emergency room where he presented with complaints of chest pain earlier this morning. Patient says for the last 2 days he has been having intermittent episodes of diaphoresis. Episodes last a few minutes. Patient denies having any fever, cough, rhinorrhea or vomiting. He says he has episodes of nausea which are mild. He said this morning he started having pain in the left chest which is nonperceptible. Pain is sharp and has been continuous. Pain medication were given which helped resolve the pain. However it has been episodic since then and patient feels that intermittently. Patient denies having any fall or injury. Denies any trauma to his chest wall    Patient denies any cough, dyspnea or orthopnea. Denies any headache, neck pain or photophobia. Denies any sinus pressure. Denies any abdominal pain, vomiting, diarrhea or constipation. Denies any rash or joint swelling.   Denies any back pain    I have personally reviewed the past medical history, past surgical history, medications, social history, and family history, and summarized in the note. Review of Systems:     All 10 point system is reviewed and negative otherwise mentioned in HPI. Past Medical History:     Past Medical History:   Diagnosis Date    HTN (hypertension)         Past Surgical History:     Past Surgical History:   Procedure Laterality Date    APPENDECTOMY      VASECTOMY          Medications Prior to Admission:       Prior to Admission medications    Medication Sig Start Date End Date Taking? Authorizing Provider   atorvastatin (LIPITOR) 40 MG tablet take 1 tablet by mouth once daily 21   Historical Provider, MD   butalbital-acetaminophen-caffeine (FIORICET, ESGIC) -40 MG per tablet Take 1 tablet by mouth every 4 hours as needed for Headaches 22   UZIEL North - CNP        Allergies:       Patient has no known allergies. Social History:     Tobacco:    reports that he quit smoking about 22 years ago. He has never used smokeless tobacco.  Alcohol:      reports no history of alcohol use. Drug Use:  reports no history of drug use.     Family History:     Family History   Problem Relation Age of Onset    Heart Disease Mother          Physical Exam:     Vitals:  /84   Pulse 75   Temp 97.9 °F (36.6 °C) (Oral)   Resp 22   Ht 5' 4\" (1.626 m)   Wt 162 lb (73.5 kg)   SpO2 94%   BMI 27.81 kg/m²   Temp (24hrs), Av.9 °F (36.6 °C), Min:97.9 °F (36.6 °C), Max:97.9 °F (36.6 °C)          General appearance - alert, well appearing, and in no acute distress  Mental status - oriented to person, place, and time with normal affect  Head - normocephalic and atraumatic  Eyes - pupils equal and reactive, extraocular eye movements intact, conjunctiva clear  Ears - hearing appears to be intact  Nose - no drainage noted  Mouth - mucous membranes moist  Neck - supple, no carotid bruits, thyroid not palpable  Chest - clear to auscultation, normal effort  Heart - assistance of a speech-recognition program.  Although the intention is to generate a document that actually reflects the content of the visit, no guarantees can be provided that every mistake has been identified and corrected by editing. Note was updated later by me after  physical examination and  completion of the assessment.

## 2022-03-14 NOTE — CONSULTS
Section of Cardiology   Consult Note      Reason for Consult: Chest pain  Requesting Physician: Av Keene MD    CHIEF COMPLAINT: Chest pain    History Obtained From:  patient, electronic medical record, patient's nurse    HISTORY OF PRESENT ILLNESS:      The patient is a 60 y.o. male with significant past medical history of hyperlipidemia who presents with new onset chest pain. The patient said he did not feel well yesterday and he was feeling sweaty and clammy and he remained at rest and did not do much of activities. This morning he woke up with left-sided chest pain graded 5 out of 10 scale radiating to the shoulder and left arm. He came to emergency room and received 2 sublingual nitroglycerin with complete resolution of his pain. At the time I saw him he was laying in bed comfortably. He stated that he started to have some sweating. Patient never had any specific heart disease. He has physical job and he tolerates activities very well without previous chest pain history. He tells me his mom  from heart attack at age 79 and his sister too but he does not know what was wrong with his sister. Patient takes statin. He does not report any other symptoms. No weakness or numbness in any arm or leg. Denies any fever chills or cough. No PND orthopnea. No edema or claudications. Previous diagnostic testing for coronary artery disease includes: none. Previous history of cardiac disease includes: none. Coronary artery disease risk factors include: advanced age (older than 54 for men, 72 for women), dyslipidemia and male gender. Past Medical History:    Past Medical History:   Diagnosis Date    HTN (hypertension)      Past Surgical History:    Past Surgical History:   Procedure Laterality Date    APPENDECTOMY      VASECTOMY       Home Medications:  Prior to Admission medications    Medication Sig Start Date End Date Taking?  Authorizing Provider   vitamin E 400 UNIT capsule Take 400 Units by mouth daily   Yes Historical Provider, MD   atorvastatin (LIPITOR) 40 MG tablet take 1 tablet by mouth once daily 12/13/21  Yes Historical Provider, MD     Current Medications:    Current Facility-Administered Medications   Medication Dose Route Frequency Provider Last Rate Last Admin    nitroGLYCERIN (NITROSTAT) SL tablet 0.4 mg  0.4 mg SubLINGual Q5 Min PRN Xu Freire MD   0.4 mg at 03/14/22 1136    butalbital-acetaminophen-caffeine (FIORICET, ESGIC) per tablet 1 tablet  1 tablet Oral Q4H PRN Xu Freire MD        sodium chloride flush 0.9 % injection 5-40 mL  5-40 mL IntraVENous 2 times per day Minnie Hodgkins, MD        sodium chloride flush 0.9 % injection 10 mL  10 mL IntraVENous PRN Xu Freire MD        0.9 % sodium chloride infusion  25 mL IntraVENous PRN Xu Freire MD        ondansetron (ZOFRAN-ODT) disintegrating tablet 4 mg  4 mg Oral Q8H PRN Xu Freire MD        Or    ondansetron (ZOFRAN) injection 4 mg  4 mg IntraVENous Q6H PRN Xu Freire MD        acetaminophen (TYLENOL) tablet 650 mg  650 mg Oral Q6H PRN Xu Freire MD        Or    acetaminophen (TYLENOL) suppository 650 mg  650 mg Rectal Q6H PRN Xu Freire MD        magnesium hydroxide (MILK OF MAGNESIA) 400 MG/5ML suspension 30 mL  30 mL Oral Daily PRN Xu Freire MD        aspirin EC tablet 325 mg  325 mg Oral Daily Xu Freire MD   325 mg at 03/14/22 1617    enoxaparin (LOVENOX) injection 40 mg  40 mg SubCUTAneous Daily Xu Freire MD        potassium chloride (KLOR-CON M) extended release tablet 40 mEq  40 mEq Oral PRN Xu Freire MD        Or    potassium bicarb-citric acid (EFFER-K) effervescent tablet 40 mEq  40 mEq Oral PRN Xu Freire MD        Or    potassium chloride 10 mEq/100 mL IVPB (Peripheral Line)  10 mEq IntraVENous PRN Xu Freire MD        potassium chloride 10 mEq/100 mL IVPB (Peripheral Line)  10 mEq IntraVENous PRN Minnie Hodgkins, MD        magnesium sulfate 1000 mg in dextrose 5% 100 mL IVPB  1,000 mg IntraVENous PRN Xu Freire MD        atorvastatin (LIPITOR) tablet 40 mg  40 mg Oral Nightly Xu Freire MD        nitroGLYCERIN (NITROSTAT) SL tablet 0.4 mg  0.4 mg SubLINGual Q5 Min PRN Jose Li MD         Allergies:  Patient has no known allergies. Social History:    Social History     Socioeconomic History    Marital status:      Spouse name: Not on file    Number of children: Not on file    Years of education: Not on file    Highest education level: Not on file   Occupational History    Not on file   Tobacco Use    Smoking status: Former Smoker     Quit date:      Years since quittin.2    Smokeless tobacco: Never Used   Substance and Sexual Activity    Alcohol use: No    Drug use: No    Sexual activity: Not on file   Other Topics Concern    Not on file   Social History Narrative    Not on file     Social Determinants of Health     Financial Resource Strain:     Difficulty of Paying Living Expenses: Not on file   Food Insecurity:     Worried About [address] in the Last Year: Not on file    [address] in the Last Year: Not on file   Transportation Needs:     Lack of Transportation (Medical): Not on file    Lack of Transportation (Non-Medical):  Not on file   Physical Activity:     Days of Exercise per Week: Not on file    Minutes of Exercise per Session: Not on file   Stress:     Feeling of Stress : Not on file   Social Connections:     Frequency of Communication with Friends and Family: Not on file    Frequency of Social Gatherings with Friends and Family: Not on file    Attends [employer] Services: Not on file    Active Member of Clubs or Organizations: Not on file    Attends Club or Organization Meetings: Not on file    Marital Status: Not on file   Intimate Partner Violence:     Fear of Current or Ex-Partner: Not on file    Emotionally Abused: Not on file    Physically Abused: Not on file  Sexually Abused: Not on file   Housing Stability:     Unable to Pay for Housing in the Last Year: Not on file    Number of Places Lived in the Last Year: Not on file    Unstable Housing in the Last Year: Not on file     Family History:   Family History   Problem Relation Age of Onset    Heart Disease Mother        · REVIEW OF SYSTEMS   Other than above negative    PHYSICAL EXAM:    Vitals:    VITALS:  BP (!) 138/94   Pulse 65   Temp 98.1 °F (36.7 °C) (Oral)   Resp 14   Ht 5' 4\" (1.626 m)   Wt 162 lb (73.5 kg)   SpO2 94%   BMI 27.81 kg/m²   24HR INTAKE/OUTPUT:  No intake or output data in the 24 hours ending 03/14/22 1725    CONSTITUTIONAL:  awake, alert, cooperative, no apparent distress, and appears stated age  EYES: Pupils equal, round and reactive to light, extra ocular muscles intact, sclera clear, conjunctiva normal  ENT:  normocepalic, without obvious abnormality  NECK:  supple, symmetrical, trachea midline, no carotid bruit ,   No  JVD  BACK:  symmetric  LUNGS: Non-labored, good air exchange, clear to auscultation bilaterally, no crackles or wheezing  CARDIOVASCULAR:  Normal apical impulse, regular rate and rhythm, normal S1 and S2, no S3 or S4, and no murmur noted, no rub. femoral, radial and bilateralpresent 2+  ABDOMEN:  No scars, normal bowel sounds, soft, non-distended, non-tender,   MUSCULOSKELETAL:  there is no redness, warmth, or swelling of the joints   No leg edema. NEUROLOGIC:  Awake, alert, oriented to name, place and time.   SKIN:  no bruising or bleeding, normal skin color, texture, turgor and no jaundice    DATA:   ECG: Normal sinus rhythm, right bundle branch block, APCs, no acute ST-T changes  ECHO: Date:   Not performed to date  Stress Test:  Not performed to date  Angiography:  Not performed to date  Chest x-ray showed no acute process  Cardiology Labs:  Recent Labs     03/14/22  0931 03/14/22  1050   MYOGLOBIN 61 44     Warfarin PT/INR:  Lab Results   Component Value Date PROTIME 10.2 05/05/2019    INR 1.0 05/05/2019     CBC:  Lab Results   Component Value Date    WBC 4.8 03/14/2022    RBC 5.06 03/14/2022    HGB 15.9 03/14/2022    HCT 47.4 03/14/2022    MCV 93.7 03/14/2022    MCH 31.4 03/14/2022    MCHC 33.5 03/14/2022    RDW 12.5 03/14/2022     03/14/2022    MPV 8.6 03/14/2022     CMP:  Lab Results   Component Value Date     03/14/2022    K 3.8 03/14/2022    CL 99 03/14/2022    CO2 23 03/14/2022    BUN 15 03/14/2022    CREATININE 0.71 03/14/2022    GFRAA >60 03/14/2022    LABGLOM >60 03/14/2022    GLUCOSE 125 03/14/2022    CALCIUM 9.5 03/14/2022     Magnesium:    Lab Results   Component Value Date    MG 2.2 05/06/2019     PTT:    Lab Results   Component Value Date    APTT 25.6 05/05/2019     TSH:  No results found for: TSH  BMP:  Lab Results   Component Value Date     03/14/2022    K 3.8 03/14/2022    CL 99 03/14/2022    CO2 23 03/14/2022    BUN 15 03/14/2022    LABALBU 4.1 05/06/2019    CREATININE 0.71 03/14/2022    CALCIUM 9.5 03/14/2022    GFRAA >60 03/14/2022    LABGLOM >60 03/14/2022    GLUCOSE 125 03/14/2022     LIVER PROFILE:No results for input(s): AST, ALT, LABALBU, ALKPHOS, BILITOT, BILIDIR, IBILI, PROT, GLOB, ALBUMIN in the last 72 hours. FLP:    Lab Results   Component Value Date    CHOL 207 05/06/2019    TRIG 214 05/06/2019    HDL 46 05/06/2019    LDLCHOLESTEROL 118 05/06/2019             IMPRESSION  1. Nonexertional chest pain, suspicious to be cardiac but not definitive without evidence of acute coronary syndrome  2. hyperlipidemia  3.   Chronic right bundle branch block     Patient Active Problem List   Diagnosis    Abnormal EKG    Dyspnea    Mass of chest wall, right    Musculoskeletal chest pain    Mixed hyperlipidemia    Paraspinal mass     Chest pain           RECOMMENDATIONS:     Continue current medications  Management plan was discussed with patient     Schedule noninvasive cardiac work-up if the patient remained stable and remained with normal cardiac enzymes. I will schedule him to have an echocardiogram and treadmill stress test tomorrow morning. Depending on his progression will decide the next step. Case discussed with the patient and his nurse  Thank you for consultation.       Electronically signed by Maria Alejandra Cole MD on 3/14/2022 at 5:25 PM     CC: Rodger Banda MD

## 2022-03-14 NOTE — PROGRESS NOTES
Report received from ED RN Media Friend. Patient to be observed r/t CP. Nitro given X2 and CP resolved per RN.

## 2022-03-15 ENCOUNTER — APPOINTMENT (OUTPATIENT)
Dept: ULTRASOUND IMAGING | Age: 57
End: 2022-03-15
Payer: COMMERCIAL

## 2022-03-15 ENCOUNTER — APPOINTMENT (OUTPATIENT)
Dept: NUCLEAR MEDICINE | Age: 57
End: 2022-03-15
Payer: COMMERCIAL

## 2022-03-15 VITALS
DIASTOLIC BLOOD PRESSURE: 92 MMHG | HEART RATE: 81 BPM | RESPIRATION RATE: 18 BRPM | SYSTOLIC BLOOD PRESSURE: 133 MMHG | OXYGEN SATURATION: 93 % | BODY MASS INDEX: 27.66 KG/M2 | WEIGHT: 162 LBS | TEMPERATURE: 98.2 F | HEIGHT: 64 IN

## 2022-03-15 LAB
ALBUMIN SERPL-MCNC: 4.2 G/DL (ref 3.5–5.2)
ALP BLD-CCNC: 78 U/L (ref 40–129)
ALT SERPL-CCNC: 50 U/L (ref 5–41)
ANION GAP SERPL CALCULATED.3IONS-SCNC: 14 MMOL/L (ref 9–17)
AST SERPL-CCNC: 44 U/L
BILIRUB SERPL-MCNC: 0.39 MG/DL (ref 0.3–1.2)
BUN BLDV-MCNC: 16 MG/DL (ref 6–20)
BUN/CREAT BLD: 24 (ref 9–20)
CALCIUM SERPL-MCNC: 9.2 MG/DL (ref 8.6–10.4)
CHLORIDE BLD-SCNC: 104 MMOL/L (ref 98–107)
CO2: 21 MMOL/L (ref 20–31)
CREAT SERPL-MCNC: 0.68 MG/DL (ref 0.7–1.2)
EKG ATRIAL RATE: 67 BPM
EKG P AXIS: 46 DEGREES
EKG P-R INTERVAL: 154 MS
EKG Q-T INTERVAL: 406 MS
EKG QRS DURATION: 152 MS
EKG QTC CALCULATION (BAZETT): 429 MS
EKG R AXIS: -61 DEGREES
EKG T AXIS: 6 DEGREES
EKG VENTRICULAR RATE: 67 BPM
GFR AFRICAN AMERICAN: >60 ML/MIN
GFR NON-AFRICAN AMERICAN: >60 ML/MIN
GFR SERPL CREATININE-BSD FRML MDRD: ABNORMAL ML/MIN/{1.73_M2}
GLUCOSE BLD-MCNC: 110 MG/DL (ref 70–99)
HAV IGM SER IA-ACNC: NONREACTIVE
HCT VFR BLD CALC: 46.7 % (ref 40.7–50.3)
HEMOGLOBIN: 15.7 G/DL (ref 13–17)
HEPATITIS B CORE IGM ANTIBODY: NONREACTIVE
HEPATITIS B SURFACE ANTIGEN: NONREACTIVE
HEPATITIS C ANTIBODY: NONREACTIVE
LV EF: 55 %
LV EF: 60 %
LVEF MODALITY: NORMAL
LVEF MODALITY: NORMAL
MAGNESIUM: 2.2 MG/DL (ref 1.6–2.6)
MCH RBC QN AUTO: 31 PG (ref 25.2–33.5)
MCHC RBC AUTO-ENTMCNC: 33.6 G/DL (ref 28.4–34.8)
MCV RBC AUTO: 92.1 FL (ref 82.6–102.9)
NRBC AUTOMATED: 0 PER 100 WBC
PDW BLD-RTO: 12.4 % (ref 11.8–14.4)
PLATELET # BLD: 248 K/UL (ref 138–453)
PMV BLD AUTO: 8.4 FL (ref 8.1–13.5)
POTASSIUM SERPL-SCNC: 3.8 MMOL/L (ref 3.7–5.3)
RBC # BLD: 5.07 M/UL (ref 4.21–5.77)
SODIUM BLD-SCNC: 139 MMOL/L (ref 135–144)
TOTAL PROTEIN: 7.2 G/DL (ref 6.4–8.3)
WBC # BLD: 4.6 K/UL (ref 3.5–11.3)

## 2022-03-15 PROCEDURE — 93010 ELECTROCARDIOGRAM REPORT: CPT | Performed by: INTERNAL MEDICINE

## 2022-03-15 PROCEDURE — 93017 CV STRESS TEST TRACING ONLY: CPT

## 2022-03-15 PROCEDURE — 6370000000 HC RX 637 (ALT 250 FOR IP): Performed by: FAMILY MEDICINE

## 2022-03-15 PROCEDURE — 2580000003 HC RX 258: Performed by: FAMILY MEDICINE

## 2022-03-15 PROCEDURE — 80074 ACUTE HEPATITIS PANEL: CPT

## 2022-03-15 PROCEDURE — A9500 TC99M SESTAMIBI: HCPCS | Performed by: INTERNAL MEDICINE

## 2022-03-15 PROCEDURE — 76705 ECHO EXAM OF ABDOMEN: CPT

## 2022-03-15 PROCEDURE — 2580000003 HC RX 258: Performed by: INTERNAL MEDICINE

## 2022-03-15 PROCEDURE — 80061 LIPID PANEL: CPT

## 2022-03-15 PROCEDURE — 85027 COMPLETE CBC AUTOMATED: CPT

## 2022-03-15 PROCEDURE — G0378 HOSPITAL OBSERVATION PER HR: HCPCS

## 2022-03-15 PROCEDURE — 93306 TTE W/DOPPLER COMPLETE: CPT

## 2022-03-15 PROCEDURE — 83735 ASSAY OF MAGNESIUM: CPT

## 2022-03-15 PROCEDURE — 80053 COMPREHEN METABOLIC PANEL: CPT

## 2022-03-15 PROCEDURE — 3430000000 HC RX DIAGNOSTIC RADIOPHARMACEUTICAL: Performed by: INTERNAL MEDICINE

## 2022-03-15 PROCEDURE — 78452 HT MUSCLE IMAGE SPECT MULT: CPT

## 2022-03-15 PROCEDURE — 36415 COLL VENOUS BLD VENIPUNCTURE: CPT

## 2022-03-15 RX ORDER — ALBUTEROL SULFATE 90 UG/1
2 AEROSOL, METERED RESPIRATORY (INHALATION) PRN
Status: ACTIVE | OUTPATIENT
Start: 2022-03-15 | End: 2022-03-15

## 2022-03-15 RX ORDER — METOPROLOL TARTRATE 5 MG/5ML
5 INJECTION INTRAVENOUS EVERY 5 MIN PRN
Status: ACTIVE | OUTPATIENT
Start: 2022-03-15 | End: 2022-03-15

## 2022-03-15 RX ORDER — SODIUM CHLORIDE 9 MG/ML
500 INJECTION, SOLUTION INTRAVENOUS CONTINUOUS PRN
Status: ACTIVE | OUTPATIENT
Start: 2022-03-15 | End: 2022-03-15

## 2022-03-15 RX ORDER — MULTIVITAMIN
1 CAPSULE ORAL DAILY
COMMUNITY

## 2022-03-15 RX ORDER — NITROGLYCERIN 0.4 MG/1
0.4 TABLET SUBLINGUAL EVERY 5 MIN PRN
Status: ACTIVE | OUTPATIENT
Start: 2022-03-15 | End: 2022-03-15

## 2022-03-15 RX ORDER — ATROPINE SULFATE 0.1 MG/ML
0.5 INJECTION INTRAVENOUS EVERY 5 MIN PRN
Status: ACTIVE | OUTPATIENT
Start: 2022-03-15 | End: 2022-03-15

## 2022-03-15 RX ORDER — ZOLPIDEM TARTRATE 5 MG/1
5 TABLET ORAL NIGHTLY PRN
Qty: 5 TABLET | Refills: 0 | Status: SHIPPED | OUTPATIENT
Start: 2022-03-15 | End: 2022-03-20

## 2022-03-15 RX ORDER — SODIUM CHLORIDE 0.9 % (FLUSH) 0.9 %
5-40 SYRINGE (ML) INJECTION PRN
Status: ACTIVE | OUTPATIENT
Start: 2022-03-15 | End: 2022-03-15

## 2022-03-15 RX ORDER — FAMOTIDINE 20 MG/1
20 TABLET, FILM COATED ORAL 2 TIMES DAILY
Qty: 60 TABLET | Refills: 0 | Status: SHIPPED | OUTPATIENT
Start: 2022-03-15 | End: 2022-04-14

## 2022-03-15 RX ADMIN — TETRAKIS(2-METHOXYISOBUTYLISOCYANIDE)COPPER(I) TETRAFLUOROBORATE 37.3 MILLICURIE: 1 INJECTION, POWDER, LYOPHILIZED, FOR SOLUTION INTRAVENOUS at 12:45

## 2022-03-15 RX ADMIN — SODIUM CHLORIDE, PRESERVATIVE FREE 10 ML: 5 INJECTION INTRAVENOUS at 20:09

## 2022-03-15 RX ADMIN — TETRAKIS(2-METHOXYISOBUTYLISOCYANIDE)COPPER(I) TETRAFLUOROBORATE 18.3 MILLICURIE: 1 INJECTION, POWDER, LYOPHILIZED, FOR SOLUTION INTRAVENOUS at 08:23

## 2022-03-15 RX ADMIN — SODIUM CHLORIDE 250 ML: 9 INJECTION, SOLUTION INTRAVENOUS at 07:53

## 2022-03-15 RX ADMIN — ASPIRIN 325 MG: 325 TABLET, DELAYED RELEASE ORAL at 10:12

## 2022-03-15 RX ADMIN — ATORVASTATIN CALCIUM 40 MG: 40 TABLET, FILM COATED ORAL at 20:09

## 2022-03-15 RX ADMIN — SODIUM CHLORIDE, PRESERVATIVE FREE 10 ML: 5 INJECTION INTRAVENOUS at 10:25

## 2022-03-15 NOTE — FLOWSHEET NOTE
[facility]  PROGRESS NOTE    Room # 0046/0872-94   Name: Stephane Roberson              Reason for visit: Routine. I visited the patient . Admit Date & Time: 3/14/2022  8:35 AM    Assessment:  Stephane Roberson is a 60 y.o. male. Upon entering the room patient was sitting up in bed, dressed and ready to go home. Intervention:  I introduced myself and my title as  I offered space for patient to express feelings, needs, and concerns and provided a ministry presence. Patient engaged in conversation. Gave patient spiritual care business card and offered words of encouragement and a brief prayer. Outcome:  Patient expressed gratitude for visit. Plan:  Chaplains will remain available to offer spiritual and emotional support as needed. Electronically signed by Jonatan Spencer on 3/15/2022 at 1:36 PM.  [id number] [facility]       03/15/22 1336   Encounter Summary   Services provided to: Patient   Referral/Consult From: [facility] Family members   Continue Visiting   (3/15/22)   Complexity of Encounter Moderate   Length of Encounter 15 minutes   Spiritual Assessment Completed Yes   Routine   Type Initial   Assessment Calm; Approachable;Coping   Intervention Explored coping resources;Nurtured hope;Sustaining presence/ Ministry of presence   Outcome Expressed gratitude

## 2022-03-15 NOTE — PROCEDURES
[address] [employer] [place] [address] [address]. [facility], [facility]                              CARDIAC STRESS TEST    PATIENT NAME: Gerardo Griffiths                       :        1965  MED REC NO:   [id number]                             ROOM:       9300  ACCOUNT NO:   [de-identified]                           ADMIT DATE: 2022  PROVIDER:     Alon Alvarez MD    DATE OF STUDY:  2022    EXERCISE EKG STRESS TEST    ATTENDING PROVIDER:  Jennifer Moreno MD    PRIMARY CARE PROVIDER:  Hellen Diez. Grisel Wang MD    PERFORMING PROVIDER:  Jose Manuel Alvarez MD    INDICATION:  Chest pain. The patient exercised on a Asad protocol for a total of 8 minutes and  31 seconds, representing 12.4 METS (metabolic equivalents). Peak heart  rate was 101 bpm which is 93% of the patient's maximum predicted heart  rate. The blood pressure was 134/81 mmHg at baseline and increased to 160/80  mmHg at the peak of exercise. In recovery the blood pressure returned to 138/70 mmHg. The test was terminated due to:  Reached heart rate. During the test the patient reported:  No chest pain or pressure. Functional capacity is:  Average. Heart rate response:  Normal.    Blood pressure response:  Normal.    The calculated Marquez treadmill score is 8.5. Formula:  Score = 8.5 minutes - 5* (0) - 4* (0) = 8.5    [facility] treadmill score for this test is:  Low risk (>or= to 5). The baseline EKG demonstrated:  Sinus rhythm, right bundle branch block. The exercise study did not demonstrate ST changes diagnostic of  ischemia. No atrial or ventricular ectopy was noted during the study. EKG response is:  Negative for ischemic changes. IMPRESSION:  EKG portion of pharmacologic stress test is negative for  ischemia. Nuclear portion reported separately.         Jenn Padilla MD    D: 03/15/2022 9:43:07       T: 03/15/2022 9:44:49     [email or web address]  Job#: [id number]     Doc#: Unknown

## 2022-03-15 NOTE — PLAN OF CARE
Problem: Discharge Planning:  Goal: Discharged to appropriate level of care  Description: Discharged to appropriate level of care  3/15/2022 1745 by Moody Mar RN  Outcome: Ongoing  3/15/2022 0631 by Yordy Upton RN  Outcome: Ongoing     Problem: Cardiac Output - Decreased:  Goal: Hemodynamic stability will improve  Description: Hemodynamic stability will improve  3/15/2022 1745 by Moody Mar RN  Outcome: Ongoing  3/15/2022 0631 by Yordy Upton RN  Outcome: Ongoing     Problem: Serum Glucose Level - Abnormal:  Goal: Ability to maintain appropriate glucose levels will improve  Description: Ability to maintain appropriate glucose levels will improve  3/15/2022 1745 by Moody Mar RN  Outcome: Ongoing  3/15/2022 0631 by Yordy Upton RN  Outcome: Ongoing     Problem: Pain:  Goal: Pain level will decrease  Description: Pain level will decrease  3/15/2022 1745 by Moody Mar RN  Outcome: Ongoing  3/15/2022 0631 by Yordy Upton RN  Outcome: Ongoing  Goal: Control of acute pain  Description: Control of acute pain  3/15/2022 1745 by Moody Mar RN  Outcome: Ongoing  3/15/2022 0631 by Yordy Upton RN  Outcome: Ongoing  Goal: Control of chronic pain  Description: Control of chronic pain  3/15/2022 0631 by Yordy Upton RN  Outcome: Ongoing

## 2022-03-15 NOTE — PROGRESS NOTES
Section of Cardiology  Progress Note      Date:  3/15/2022  Patient: Christina Beal  Admission:  3/14/2022  8:35 AM  Admit DX: Chest pain [R07.9]  Chest pain, unspecified type [R07.9]  Age:  60 y.o., 1965     LOS: 0 days     Reason for evaluation:   Chest pain      SUBJECTIVE:     The patient was seen and examined. Notes and labs reviewed. There were not complications over night. Patient seen and examined in the stress lab. He had an eventful night. Free of chest pain. Cardiac enzymes remain normal.    Patient's cardiac review of systems: negative. The patient is generally feeling gradually improving. OBJECTIVE:    Telemetry: Sinus  /81   Pulse 69   Temp 98.1 °F (36.7 °C)   Resp 18   Ht 5' 4\" (1.626 m)   Wt 162 lb (73.5 kg)   SpO2 93%   BMI 27.81 kg/m²   No intake or output data in the 24 hours ending 03/15/22 0817    EXAM:   CONSTITUTIONAL:  awake, alert, cooperative, no apparent distress, and appears stated age. HEENT: Normal jugular venous pulsations, no carotid bruits. Head is atraumatic, normocephalic. Eyes and oral mucosa are normal.  LUNGS: Good respiratory effort. No for increased work of breathing. On auscultation: clear to auscultation bilaterally  CARDIOVASCULAR:  Normal apical impulse, regular rate and rhythm, normal S1 and S2, no S3 or S4,   ABDOMEN: Soft, nontender, nondistended. Bowel sounds present. SKIN: Warm and dry. EXTREMITIES: No lower extremity edema. Motor movement grossly intact. No cyanosis or clubbing. Current Inpatient Medications:   sodium chloride flush  5-40 mL IntraVENous 2 times per day    aspirin  325 mg Oral Daily    enoxaparin  40 mg SubCUTAneous Daily    atorvastatin  40 mg Oral Nightly       IV Infusions (if any):   sodium chloride 250 mL (03/15/22 0753)    sodium chloride         Diagnostics:   EKG: . ECHO: ordered, but not yet obtained. Ejection fraction: %  Stress Test: The EKG portion of the stress test was negative for ischemia. He had adequate exercise tolerance without chest pain. Cardiac Angiography: not obtained. Labs:   CBC:   Recent Labs     03/14/22  0931 03/15/22  0600   WBC 4.8 4.6   HGB 15.9 15.7   HCT 47.4 46.7    248     BMP:   Recent Labs     03/14/22  0931 03/15/22  0600    139   K 3.8 3.8   CO2 23 21   BUN 15 16   CREATININE 0.71 0.68*   LABGLOM >60 >60   GLUCOSE 125* 110*     No results found for: BNP  PT/INR: No results for input(s): PROTIME, INR in the last 72 hours. APTT:No results for input(s): APTT in the last 72 hours. CARDIAC ENZYMES:No results for input(s): CKTOTAL, CKMB, CKMBINDEX, TROPONINT in the last 72 hours. FASTING LIPID PANEL:  Lab Results   Component Value Date    HDL 46 05/06/2019    TRIG 214 05/06/2019     LIVER PROFILE:  Recent Labs     03/15/22  0600   AST 44*   ALT 50*   LABALBU 4.2       ASSESSMENT:  1. Nonexertional chest pain, suspicious to be cardiac but not definitive without evidence of acute coronary syndrome  2. hyperlipidemia  3. Chronic right bundle branch block     Patient Active Problem List   Diagnosis    Abnormal EKG    Dyspnea    Mass of chest wall, right    Musculoskeletal chest pain    Mixed hyperlipidemia    Paraspinal mass     Chest pain       PLAN:    1. Awaiting the nuclear portion of the stress test.  If it is negative for ischemia patient can be discharged and follow-up on as-needed basis. 2. I will review the echocardiogram when it is available. Please see orders. Discussed with patient and nursing.     Loann Hamman, MD, MD

## 2022-03-15 NOTE — ACP (ADVANCE CARE PLANNING)
Advance Care Planning     Advance Care Planning Activator (Inpatient)  Conversation Note      Date of ACP Conversation: 3/15/2022     Conversation Conducted with: Patient with Decision Making Capacity    ACP Activator: Kirsten Emery RN        Health Care Decision Maker:     Current Designated Health Care Decision Maker:     Click here to complete Healthcare Decision Makers including section of the Healthcare Decision Maker Relationship (ie \"Primary\")  Today we documented Decision Maker(s) consistent with Legal Next of Kin hierarchy. Care Preferences    Ventilation: \"If you were in your present state of health and suddenly became very ill and were unable to breathe on your own, what would your preference be about the use of a ventilator (breathing machine) if it were available to you? \"      Would the patient desire the use of ventilator (breathing machine)?: yes    \"If your health worsens and it becomes clear that your chance of recovery is unlikely, what would your preference be about the use of a ventilator (breathing machine) if it were available to you? \"     Would the patient desire the use of ventilator (breathing machine)?: Yes      Resuscitation  \"CPR works best to restart the heart when there is a sudden event, like a heart attack, in someone who is otherwise healthy. Unfortunately, CPR does not typically restart the heart for people who have serious health conditions or who are very sick. \"    \"In the event your heart stopped as a result of an underlying serious health condition, would you want attempts to be made to restart your heart (answer \"yes\" for attempt to resuscitate) or would you prefer a natural death (answer \"no\" for do not attempt to resuscitate)? \" yes       [x] Yes   [] No   Educated Patient / Jose Manuel White regarding differences between Advance Directives and portable DNR orders.     Length of ACP Conversation in minutes:      Conversation Outcomes:  [x] ACP discussion completed  [] Existing advance directive reviewed with patient; no changes to patient's previously recorded wishes  [] New Advance Directive completed  [] Portable Do Not Rescitate prepared for Provider review and signature  [] POLST/POST/MOLST/MOST prepared for Provider review and signature      Follow-up plan:    [] Schedule follow-up conversation to continue planning  [] Referred individual to Provider for additional questions/concerns   [] Advised patient/agent/surrogate to review completed ACP document and update if needed with changes in condition, patient preferences or care setting    [] This note routed to one or more involved healthcare providers

## 2022-03-15 NOTE — PLAN OF CARE
Patient denied chest pain this shift. Rested after receiving ordered medication to help sleep. NPO promptly at midnight for today's procedures. Safety precautions in place. Remained uneventful this shift.     Problem: Tissue Perfusion - Cardiopulmonary, Altered:  Goal: Absence of angina  Description: Absence of angina  Outcome: Ongoing     Problem: Tissue Perfusion - Cardiopulmonary, Altered:  Goal: Hemodynamic stability will improve  Description: Hemodynamic stability will improve  Outcome: Ongoing     Problem: Safety:  Goal: Free from accidental physical injury  Description: Free from accidental physical injury  Outcome: Ongoing

## 2022-03-15 NOTE — PROGRESS NOTES
[facility].,    Adult Hospitalist      Name: Asya Cruz  MRN: [id number]     Acct: [de-identified]  Room: [facility]9675-74    Admit Date: 3/14/2022  8:35 AM  PCP: Dejah Lara MD    Primary Problem  Principal Problem:    Chest pain  Resolved Problems:    * No resolved hospital problems. *        Assesment:     · Chest pain, unspecified  · Mixed hyperlipidemia  · Tension headaches  · Overweight        Plan:     · Admit for observation  · Monitor vitals closely  · Keep SPO2 above 90%  · I's and O's  · IV Hep-Lock  · Pain control  · Serial EKG  · Serial cardiac enzymes  · Antiemetics as needed  · Aspirin  · HUMA B statin  · Consult cardiology  · Chest x-ray  · CBC, BMP  · Lipid panel  · Hemoglobin A1c  · DVT and GI prophylaxis. Chief Complaint:     No chief complaint on file. Chest pain    History of Present Illness:      Asya Cruz is a 60 y.o.  male who presents with No chief complaint on file. Patient admitted from the emergency room where he presented with complaints of chest pain earlier this morning. Patient says for the last 2 days he has been having intermittent episodes of diaphoresis. Episodes last a few minutes. Patient denies having any fever, cough, rhinorrhea or vomiting. He says he has episodes of nausea which are mild. He said this morning he started having pain in the left chest which is nonperceptible. Pain is sharp and has been continuous. Pain medication were given which helped resolve the pain. However it has been episodic since then and patient feels that intermittently. Patient denies having any fall or injury. Denies any trauma to his chest wall    Patient denies any cough, dyspnea or orthopnea. Denies any headache, neck pain or photophobia. Denies any sinus pressure. Denies any abdominal pain, vomiting, diarrhea or constipation. Denies any rash or joint swelling.   Denies any back pain    I have personally reviewed the past medical history, past surgical history, medications, social history, and family history, and summarized in the note. Review of Systems:     All 10 point system is reviewed and negative otherwise mentioned in HPI. Past Medical History:     Past Medical History:   Diagnosis Date    HTN (hypertension)         Past Surgical History:     Past Surgical History:   Procedure Laterality Date    APPENDECTOMY      VASECTOMY          Medications Prior to Admission:       Prior to Admission medications    Medication Sig Start Date End Date Taking? Authorizing Provider   vitamin E 400 UNIT capsule Take 400 Units by mouth daily   Yes Historical Provider, MD   atorvastatin (LIPITOR) 40 MG tablet take 1 tablet by mouth once daily 21  Yes Historical Provider, MD        Allergies:       Patient has no known allergies. Social History:     Tobacco:    reports that he quit smoking about 22 years ago. He has never used smokeless tobacco.  Alcohol:      reports no history of alcohol use. Drug Use:  reports no history of drug use.     Family History:     Family History   Problem Relation Age of Onset    Heart Disease Mother          Physical Exam:     Vitals:  BP (!) 140/92   Pulse 71   Temp 98.1 °F (36.7 °C)   Resp 16   Ht 5' 4\" (1.626 m)   Wt 162 lb (73.5 kg)   SpO2 93%   BMI 27.81 kg/m²   Temp (24hrs), Av °F (36.7 °C), Min:97.7 °F (36.5 °C), Max:98.2 °F (36.8 °C)          General appearance - alert, well appearing, and in no acute distress  Mental status - oriented to person, place, and time with normal affect  Head - normocephalic and atraumatic  Eyes - pupils equal and reactive, extraocular eye movements intact, conjunctiva clear  Ears - hearing appears to be intact  Nose - no drainage noted  Mouth - mucous membranes moist  Neck - supple, no carotid bruits, thyroid not palpable  Chest - clear to auscultation, normal effort  Heart - normal rate, regular rhythm, no murmur  Abdomen - soft, nontender, nondistended, bowel sounds present all four quadrants, no masses, hepatomegaly or splenomegaly  Neurological - normal speech, no focal findings or movement disorder noted, cranial nerves II through XII grossly intact  Extremities - peripheral pulses palpable, no pedal edema or calf pain with palpation  Skin - no gross lesions, rashes, or induration noted        Data:     Labs:    Hematology:  Recent Labs     03/14/22  0931 03/15/22  0600   WBC 4.8 4.6   RBC 5.06 5.07   HGB 15.9 15.7   HCT 47.4 46.7   MCV 93.7 92.1   MCH 31.4 31.0   MCHC 33.5 33.6   RDW 12.5 12.4    248   MPV 8.6 8.4     Chemistry:  Recent Labs     03/14/22  0931 03/14/22  0931 03/14/22  1050 03/14/22  1640 03/14/22  2033 03/15/22  0600     --   --   --   --  139   K 3.8  --   --   --   --  3.8   CL 99  --   --   --   --  104   CO2 23  --   --   --   --  21   GLUCOSE 125*  --   --   --   --  110*   BUN 15  --   --   --   --  16   CREATININE 0.71  --   --   --   --  0.68*   MG  --   --   --   --   --  2.2   ANIONGAP 13  --   --   --   --  14   LABGLOM >60  --   --   --   --  >60   GFRAA >60  --   --   --   --  >60   CALCIUM 9.5  --   --   --   --  9.2   TROPHS 10   < > 10 8 8  --    MYOGLOBIN 61  --  44  --   --   --     < > = values in this interval not displayed. Recent Labs     03/15/22  0600   PROT 7.2   LABALBU 4.2   AST 44*   ALT 50*   ALKPHOS 78   BILITOT 0.39       Lab Results   Component Value Date    INR 1.0 05/05/2019    INR 1.0 07/13/2012    PROTIME 10.2 05/05/2019    PROTIME 10.8 07/13/2012       Lab Results   Component Value Date/Time    SPECIAL NOT REPORTED 01/14/2018 07:42 PM     No results found for: CULTURE    No results found for: POCPH, PHART, PH, POCPCO2, GBT5NPO, PCO2, POCPO2, PO2ART, PO2, POCHCO3, BTR3SUQ, HCO3, NBEA, PBEA, BEART, BE, THGBART, THB, QGT3SJM, DHEY5IMY, H0AMYEUF, O2SAT, FIO2    Radiology:    XR CHEST PORTABLE    Result Date: 3/14/2022  No acute cardiopulmonary process.          All radiological studies reviewed                Code Status:  Full Code    Electronically signed by Humphrey Smith MD on 3/15/2022 at 7:39 AM     Copy sent to Dr. Darrick Amin MD    This note was created with the assistance of a speech-recognition program.  Although the intention is to generate a document that actually reflects the content of the visit, no guarantees can be provided that every mistake has been identified and corrected by editing. Note was updated later by me after  physical examination and  completion of the assessment.

## 2022-03-15 NOTE — CARE COORDINATION
Case Management Initial Discharge Plan  General Hussar,         Readmission Risk              Risk of Unplanned Readmission:  0             Met with:patient to discuss discharge plans. Information verified: address, contacts, phone number, , insurance Yes  PCP: Darrick Amin MD  Date of last visit: few weeks     Insurance Provider: Jose L Muhammad     Discharge Planning  Current Residence:  Private home  Living Arrangements:  Spouse/Significant [place] 32 Members       Home has 1 stories/1 stairs to climb  Support Systems:  Family Members, Spouse/Significant Other, Children         Current Services PTA:  None   Agency: none        Patient able to perform ADL's:Independent  DME in home:  None   DME used to aid ambulation prior to admission:   None   DME used during admission:  None      Potential Assistance Needed:  N/A     Pharmacy: [facility]    Potential Assistance Purchasing Medications:  No  Does patient want to participate in local refill/ meds to beds program?  Not Assessed     Patient agreeable to home care: No  Isabella of choice provided:  n/a        Type of Home Care Services:  None  Patient expects to be discharged to:  home     Prior SNF/Rehab Placement and Facility: none   Agreeable to SNF/Rehab: No  Isabella of choice provided: n/a   Evaluation: n/a     Expected Discharge date:  21  Follow Up Appointment: Best Day/ Time: Wednesday AM     Transportation provider: per wife   Transportation arrangements needed for discharge: No     Discharge Plan:   Patient lives with spouse and very independent. He works [employer] and drives. He uses no DME. Patient admitted with chest pain and cardiology is following. Stress test and echo ordered. If WNL will anticipate discharge home with no needs.      Electronically signed by Marko Pratt RN on 3/15/22 at 2:01 PM EDT

## 2022-03-15 NOTE — PROGRESS NOTES
Transitions of Care Pharmacy Service   Medication Review    The patient's list of current home medications has been reviewed. Source(s) of information: Patient, pharmacy, surescripts    Based on information provided by the above source(s), I have updated the patient's home med list as described below. Please review the ACTION REQUESTED section of this note for any discrepancies on current hospital orders. I changed or updated the following medications on the patient's home medication list:  Discontinued None     Added Multivitamin caps QD     Adjusted   None          Please feel free to call me with any questions about this encounter. Thank you. This note will be reviewed and co-signed by the Transitions of Care Pharmacist.    Edinson Regan, PharmD student  [facility] Service  Phone:  [phone]  Fax: [phone]      Electronically signed by Edinson Regan on 3/15/2022 at 11:47 AM     Prior to Admission medications    Medication Sig Start Date End Date Taking?  Authorizing Provider   Multiple Vitamin (MULTIVITAMIN) capsule Take 1 capsule by mouth daily   Yes Historical Provider, MD   vitamin E 400 UNIT capsule Take 400 Units by mouth daily   Yes Historical Provider, MD   atorvastatin (LIPITOR) 40 MG tablet take 1 tablet by mouth once daily 12/13/21  Yes Historical Provider, MD

## 2022-03-17 LAB
CHOLESTEROL/HDL RATIO: 2.8
CHOLESTEROL: 128 MG/DL
HDLC SERPL-MCNC: 45 MG/DL
LDL CHOLESTEROL: 64 MG/DL (ref 0–130)
TRIGL SERPL-MCNC: 94 MG/DL